# Patient Record
Sex: MALE | Race: WHITE | NOT HISPANIC OR LATINO | Employment: FULL TIME | URBAN - METROPOLITAN AREA
[De-identification: names, ages, dates, MRNs, and addresses within clinical notes are randomized per-mention and may not be internally consistent; named-entity substitution may affect disease eponyms.]

---

## 2018-01-18 NOTE — PROGRESS NOTES
Chief Complaint  pt rec flu vaccine      Active Problems    1  Acute serous otitis media (381 01) (H65 00)   2  Benign essential hypertension (401 1) (I10)   3  Blood pressure elevated (401 9) (I10)   4  BMI 36 0-36 9,adult (V85 36) (Z68 36)   5  Mixed hyperlipidemia (272 2) (E78 2)   6  Nicotine dependence (305 1) (F17 200)   7  Obstructive sleep apnea (327 23) (G47 33)    Current Meds   1  Amoxicillin 875 MG Oral Tablet; TAKE 1 TABLET EVERY 12 HOURS DAILY; Therapy: 31OSU9496 to (Evaluate:[id number])  Requested for: 43RVU4754; Last   Rx:[id number] Ordered   2  Metoprolol Succinate ER 25 MG Oral Tablet Extended Release 24 Hour (Toprol XL); 1   every day; Therapy: 27KYI5290 to (Last Rx:[id number])  Requested for: 35VFT4665 Ordered    Allergies    1   Sulfa Drugs    Signatures   Electronically signed by : MATRIN Rendon ; Oct 25 2016  3:13PM EST

## 2018-02-13 ENCOUNTER — OFFICE VISIT (OUTPATIENT)
Dept: FAMILY MEDICINE CLINIC | Facility: CLINIC | Age: 53
End: 2018-02-13
Payer: COMMERCIAL

## 2018-02-13 DIAGNOSIS — R73.9 ELEVATED BLOOD SUGAR: Primary | ICD-10-CM

## 2018-02-13 DIAGNOSIS — E11.9 TYPE 2 DIABETES MELLITUS WITHOUT COMPLICATION, WITHOUT LONG-TERM CURRENT USE OF INSULIN (HCC): Primary | ICD-10-CM

## 2018-02-13 LAB — SL AMB POCT HEMOGLOBIN AIC: 10.5

## 2018-02-13 PROCEDURE — 83036 HEMOGLOBIN GLYCOSYLATED A1C: CPT | Performed by: FAMILY MEDICINE

## 2018-02-16 NOTE — PROGRESS NOTES
I have reviewed the notes, assessments, and/or procedures performed by resident, I concurconcur with her/his documentation of Maxim Mendoza

## 2018-03-26 RX ORDER — METHYLPREDNISOLONE 4 MG/1
4 TABLET ORAL 2 TIMES DAILY
Refills: 0 | Status: CANCELLED | OUTPATIENT
Start: 2018-03-26

## 2018-04-21 ENCOUNTER — APPOINTMENT (EMERGENCY)
Dept: RADIOLOGY | Facility: HOSPITAL | Age: 53
End: 2018-04-21
Payer: COMMERCIAL

## 2018-04-21 ENCOUNTER — HOSPITAL ENCOUNTER (EMERGENCY)
Facility: HOSPITAL | Age: 53
Discharge: HOME | End: 2018-04-21
Attending: EMERGENCY MEDICINE
Payer: COMMERCIAL

## 2018-04-21 VITALS
RESPIRATION RATE: 20 BRPM | TEMPERATURE: 99.1 F | HEART RATE: 96 BPM | WEIGHT: 235 LBS | SYSTOLIC BLOOD PRESSURE: 156 MMHG | OXYGEN SATURATION: 94 % | DIASTOLIC BLOOD PRESSURE: 88 MMHG | BODY MASS INDEX: 33.72 KG/M2

## 2018-04-21 DIAGNOSIS — I10 HTN (HYPERTENSION): ICD-10-CM

## 2018-04-21 DIAGNOSIS — S63.275A CLOSED DISLOCATION OF INTERPHALANGEAL JOINT OF LEFT RING FINGER: Primary | ICD-10-CM

## 2018-04-21 PROCEDURE — 99283 EMERGENCY DEPT VISIT LOW MDM: CPT

## 2018-04-21 PROCEDURE — 73140 X-RAY EXAM OF FINGER(S): CPT

## 2018-04-21 PROCEDURE — 96374 THER/PROPH/DIAG INJ IV PUSH: CPT

## 2018-04-21 PROCEDURE — 73130 X-RAY EXAM OF HAND: CPT

## 2018-04-21 PROCEDURE — 96375 TX/PRO/DX INJ NEW DRUG ADDON: CPT

## 2018-04-21 RX ORDER — LORAZEPAM 2 MG/ML
1 INJECTION INTRAMUSCULAR ONCE
Status: COMPLETED | OUTPATIENT
Start: 2018-04-21 | End: 2018-04-21

## 2018-04-21 RX ORDER — NAPROXEN 500 MG/1
500 TABLET ORAL 2 TIMES DAILY WITH MEALS
Qty: 20 TABLET | Refills: 0 | Status: SHIPPED | OUTPATIENT
Start: 2018-04-21

## 2018-04-21 RX ORDER — METOPROLOL SUCCINATE 25 MG/1
25 TABLET, EXTENDED RELEASE ORAL DAILY
COMMUNITY
End: 2018-12-11 | Stop reason: SDUPTHER

## 2018-04-21 RX ORDER — METOPROLOL TARTRATE 5 MG/5ML
5 INJECTION INTRAVENOUS ONCE
Status: COMPLETED | OUTPATIENT
Start: 2018-04-21 | End: 2018-04-21

## 2018-04-21 RX ORDER — FENTANYL CITRATE 50 UG/ML
100 INJECTION, SOLUTION INTRAMUSCULAR; INTRAVENOUS ONCE
Status: COMPLETED | OUTPATIENT
Start: 2018-04-21 | End: 2018-04-21

## 2018-04-21 RX ORDER — IBUPROFEN 600 MG/1
600 TABLET ORAL ONCE
Status: COMPLETED | OUTPATIENT
Start: 2018-04-21 | End: 2018-04-21

## 2018-04-21 RX ADMIN — LORAZEPAM 1 MG: 2 INJECTION, SOLUTION INTRAMUSCULAR; INTRAVENOUS at 03:23

## 2018-04-21 RX ADMIN — FENTANYL CITRATE 100 MCG: 50 INJECTION, SOLUTION INTRAMUSCULAR; INTRAVENOUS at 03:23

## 2018-04-21 RX ADMIN — IBUPROFEN 600 MG: 600 TABLET ORAL at 03:22

## 2018-04-21 RX ADMIN — METOROPROLOL TARTRATE 5 MG: 5 INJECTION, SOLUTION INTRAVENOUS at 04:00

## 2018-04-21 RX ADMIN — LORAZEPAM 1 MG: 2 INJECTION INTRAMUSCULAR; INTRAVENOUS at 03:23

## 2018-04-21 NOTE — DISCHARGE INSTRUCTIONS
Please take a list of all of your medications and discharge paperwork with you to all of your follow-up medical visits  Please take all of your medications as directed  Please call your family doctor or return to the ER if you have increased shortness of breath, chest pain, fevers, chills, nausea, vomiting, diarrhea, or any other worsening symptoms  Finger Dislocation   WHAT YOU NEED TO KNOW:   A finger dislocation occurs when bones in your finger move out of their normal position  This takes place at a joint (where bones meet)  DISCHARGE INSTRUCTIONS:   Care for your finger:  Care for your finger as directed  This may help reduce pain and swelling  It also may improve how well you can move and use your finger  · Ice your finger: This can help decrease pain and swelling  Place a plastic bag filled with ice, or an ice pack, on your finger  Apply an ice pack as often as directed  · Elevate your finger:  Keep your finger above the level of your heart  This can help reduce swelling  Prop your arm or hand on a pillow  This should be done as often as you can for the first 1 to 3 days after your injury  Medicines:   · Pain medicine: You may be given medicine to take at home to take away or decrease pain  Do not wait until the pain is too bad before taking your medicine  · Take your medicine as directed  Contact your healthcare provider if you think your medicine is not helping or if you have side effects  Tell him or her if you are allergic to any medicine  Keep a list of the medicines, vitamins, and herbs you take  Include the amounts, and when and why you take them  Bring the list or the pill bottles to follow-up visits  Carry your medicine list with you in case of an emergency  Exercise your finger:  Exercise can help reduce pain, swelling, and stiffness in your finger  It also can help increase strength and movement  You may need to exercise your finger as soon as you can   You also may be told not to move your finger for a few weeks  Be sure to follow your healthcare provider's instructions  Occupational therapy (OT) may be ordered for you  A therapist works with you to help you regain movement in your finger  Care for your splint or cast:  Your injured finger may be kristen-taped, splinted, or put in a cast to hold your finger or thumb in place while it heals  Kristen tape is when your injured finger is taped to the finger next to it  A splint is a piece of stiff material attached to your finger using cloth straps  You may have these treatments for 8 weeks or more  To care for your splint or cast:  · Do not get your splint or cast wet  Use a plastic bag to cover the splint or cast if you shower  · Keep your splint or cast clean  Make sure no dirt gets under your splint or cast     · Do not trim your cast without talking to your healthcare provider  Also, never remove your cast on your own  Follow up with your healthcare provider or hand specialist as directed:  Write down your questions so you remember to ask them during your follow-up visits  Contact your healthcare provider or hand specialist if:   · You have numbness or tingling in your hand  · The skin under your cast or splint burns or stings  · The skin around your cast becomes red or raw  · Your cast becomes cracked or damaged  · You have questions or concerns about your injury or treatment  Return to the emergency department if:   · You have increased swelling beneath your splint or cast     · You think your cast or splint is too tight  · You cannot move your fingers  © 2017 [address] Information is for End User's use only and may not be sold, redistributed or otherwise used for commercial purposes  All illustrations and images included in CareNotes® are the copyrighted property of [employer]  or Jamie Loving  The above information is an  only   It is not intended as medical advice for individual conditions or treatments  Talk to your doctor, nurse or pharmacist before following any medical regimen to see if it is safe and effective for you  Hypertension   WHAT YOU NEED TO KNOW:   Hypertension is high blood pressure (BP)  Your BP is the force of your blood moving against the walls of your arteries  Normal BP is less than 120/80  Prehypertension is between 120/80 and 139/89  Hypertension is 140/90 or higher  Hypertension causes your BP to get so high that your heart has to work much harder than normal  This can damage your heart  You can control hypertension with a healthy lifestyle or medicines  A controlled blood pressure helps protect your organs, such as your heart, lungs, brain, and kidneys  DISCHARGE INSTRUCTIONS:   Call 911 for any of the following:   · You have discomfort in your chest that feels like squeezing, pressure, fullness, or pain  · You become confused or have difficulty speaking  · You suddenly feel lightheaded or have trouble breathing  · You have pain or discomfort in your back, neck, jaw, stomach, or arm  Return to the emergency department if:   · You have a severe headache or vision loss  · You have weakness in an arm or leg  Contact your healthcare provider if:   · You feel faint, dizzy, confused, or drowsy  · You have been taking your BP medicine and your BP is still higher than your healthcare provider says it should be  · You have questions or concerns about your condition or care  Medicines: You may  need any of the following:  · Medicine  may be used to help lower your BP  You may need more than one type of medicine  Take the medicine exactly as directed  · Diuretics  help decrease extra fluid that collects in your body  This will help lower your BP  You may urinate more often while you take this medicine  · Cholesterol medicine  helps lower your cholesterol level   A low cholesterol level helps prevent heart disease and makes it easier to control your blood pressure  · Take your medicine as directed  Contact your healthcare provider if you think your medicine is not helping or if you have side effects  Tell him or her if you are allergic to any medicine  Keep a list of the medicines, vitamins, and herbs you take  Include the amounts, and when and why you take them  Bring the list or the pill bottles to follow-up visits  Carry your medicine list with you in case of an emergency  Follow up with your healthcare provider as directed: You will need to return to have your BP checked and to have other lab tests done  Write down your questions so you remember to ask them during your visits  Manage hypertension:  Talk with your healthcare provider about these and other ways to manage hypertension:  · Check your BP at home  Sit and rest for 5 minutes before you take your BP  Extend your arm and support it on a flat surface  Your arm should be at the same level as your heart  Follow the directions that came with your BP monitor  If possible, take at least 2 BP readings each time  Take your BP at least twice a day at the same times each day, such as morning and evening  Keep a record of your BP readings and bring it to your follow-up visits  Ask your healthcare provider what your BP should be  · Limit sodium (salt) as directed  Too much sodium can affect your fluid balance  Check labels to find low-sodium or no-salt-added foods  Some low-sodium foods use potassium salts for flavor  Too much potassium can also cause health problems  Your healthcare provider will tell you how much sodium and potassium are safe for you to have in a day  He or she may recommend that you limit sodium to 2,300 mg a day  · Follow the meal plan recommended by your healthcare provider  A dietitian or your provider can give you more information on low-sodium plans or the DASH (Dietary Approaches to Stop Hypertension) eating plan   The DASH plan is low in sodium, unhealthy fats, and total fat  It is high in potassium, calcium, and fiber  · Exercise to maintain a healthy weight  Exercise at least 30 minutes per day, on most days of the week  This will help decrease your blood pressure  Ask your healthcare provider about the best exercise plan for you  · Decrease stress  This may help lower your BP  Learn ways to relax, such as deep breathing or listening to music  · Limit alcohol  Women should limit alcohol to 1 drink a day  Men should limit alcohol to 2 drinks a day  A drink of alcohol is 12 ounces of beer, 5 ounces of wine, or 1½ ounces of liquor  · Do not smoke  Nicotine and other chemicals in cigarettes and cigars can increase your BP and also cause lung damage  Ask your healthcare provider for information if you currently smoke and need help to quit  E-cigarettes or smokeless tobacco still contain nicotine  Talk to your healthcare provider before you use these products  · Manage any other health conditions you have  Health conditions such as diabetes can increase your risk for hypertension  Follow your healthcare provider's instructions and take all your medicines as directed  © 2017 [address] [facility] Information is for End User's use only and may not be sold, redistributed or otherwise used for commercial purposes  All illustrations and images included in CareNotes® are the copyrighted property of [employer]  or Jamie Loving  The above information is an  only  It is not intended as medical advice for individual conditions or treatments  Talk to your doctor, nurse or pharmacist before following any medical regimen to see if it is safe and effective for you
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
## 2018-04-25 ENCOUNTER — VBI (OUTPATIENT)
Dept: FAMILY MEDICINE CLINIC | Facility: CLINIC | Age: 53
End: 2018-04-25

## 2018-04-25 NOTE — TELEPHONE ENCOUNTER
S/W pt who states he does not need f/u at this time  Advised of hours, on call doctor, and phone number  CC: Hand Injury  DX: Closed dislocation of interphalangeal joint of left ring finger/HTN   CE

## 2018-05-08 ENCOUNTER — APPOINTMENT (OUTPATIENT)
Dept: RADIOLOGY | Facility: CLINIC | Age: 53
End: 2018-05-08
Payer: COMMERCIAL

## 2018-05-08 VITALS
SYSTOLIC BLOOD PRESSURE: 146 MMHG | WEIGHT: 233.8 LBS | DIASTOLIC BLOOD PRESSURE: 89 MMHG | BODY MASS INDEX: 33.47 KG/M2 | HEIGHT: 70 IN | HEART RATE: 97 BPM

## 2018-05-08 DIAGNOSIS — S63.259D DISLOCATION OF FINGER, SUBSEQUENT ENCOUNTER: ICD-10-CM

## 2018-05-08 DIAGNOSIS — IMO0001: Primary | ICD-10-CM

## 2018-05-08 PROCEDURE — 99203 OFFICE O/P NEW LOW 30 MIN: CPT | Performed by: ORTHOPAEDIC SURGERY

## 2018-05-08 PROCEDURE — 73140 X-RAY EXAM OF FINGER(S): CPT
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
## 2018-05-10 ENCOUNTER — EVALUATION (OUTPATIENT)
Dept: OCCUPATIONAL THERAPY | Facility: CLINIC | Age: 53
End: 2018-05-10
Payer: COMMERCIAL

## 2018-05-10 DIAGNOSIS — S63.289D DISLOCATION OF PROXIMAL INTERPHALANGEAL JOINT OF FINGER, SUBSEQUENT ENCOUNTER: Primary | ICD-10-CM

## 2018-05-10 DIAGNOSIS — IMO0001: ICD-10-CM

## 2018-05-10 PROCEDURE — 97165 OT EVAL LOW COMPLEX 30 MIN: CPT

## 2018-05-10 PROCEDURE — G8985 CARRY GOAL STATUS: HCPCS

## 2018-05-10 PROCEDURE — G8984 CARRY CURRENT STATUS: HCPCS
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
## 2018-05-14 ENCOUNTER — OFFICE VISIT (OUTPATIENT)
Dept: OCCUPATIONAL THERAPY | Facility: CLINIC | Age: 53
End: 2018-05-14
Payer: COMMERCIAL

## 2018-05-14 DIAGNOSIS — S63.289D DISLOCATION OF PROXIMAL INTERPHALANGEAL JOINT OF FINGER, SUBSEQUENT ENCOUNTER: Primary | ICD-10-CM

## 2018-05-14 PROCEDURE — 97110 THERAPEUTIC EXERCISES: CPT

## 2018-05-14 PROCEDURE — 97140 MANUAL THERAPY 1/> REGIONS: CPT

## 2018-05-14 NOTE — PROGRESS NOTES
Daily Note     Today's date: 2018  Patient name: Juan Bear  : 1965  MRN: [id number]  Referring provider: Alvaro Cuevas MD  Dx:   Encounter Diagnosis     ICD-10-CM    1  Dislocation of proximal interphalangeal joint of finger, subsequent encounter S63 289D                   Subjective: 0/10      Objective: See treatment diary below  Precautions: Standard    Specialty Daily Treatment Diary     Manual  18       graston Ring finger       Joint mobs PIP                                   Exercise Diary  18       Colored pegs 10 x 2 interossei        Thin pegs 15 x 2 interossei strengthening       Clothes pegs 2 x 20       dice 3 x 12                                                                                                                                           Modalities 18       Moist heat X~ 5 mins                                 Assessment: Tolerated treatment well  Patient would benefit from continued OT      Plan: Continue per plan of care

## 2018-05-17 ENCOUNTER — OFFICE VISIT (OUTPATIENT)
Dept: OCCUPATIONAL THERAPY | Facility: CLINIC | Age: 53
End: 2018-05-17
Payer: COMMERCIAL

## 2018-05-17 DIAGNOSIS — S63.289D DISLOCATION OF PROXIMAL INTERPHALANGEAL JOINT OF FINGER, SUBSEQUENT ENCOUNTER: Primary | ICD-10-CM

## 2018-05-17 PROCEDURE — 97110 THERAPEUTIC EXERCISES: CPT

## 2018-05-17 PROCEDURE — 97140 MANUAL THERAPY 1/> REGIONS: CPT

## 2018-05-17 NOTE — PROGRESS NOTES
Daily Note     Today's date: 2018  Patient name: Supriya Mathur  : 1965  MRN: [id number]  Referring provider: Jaylene Evans MD  Dx:   Encounter Diagnosis     ICD-10-CM    1  Dislocation of proximal interphalangeal joint of finger, subsequent encounter S63 289D           Subjective: 0/10      Objective:  Initiated therapy with moist heat x ~ 5 mins   Manual therapy with graston technique  PROM to joint  Application of kinesiotape for support and edema control  See treatment diary below  Precautions: Standard    Specialty Daily Treatment Diary     Manual  18      graston Ring finger Ring finger      Joint mobs PIP PIP                                  Exercise Diary  18      Colored pegs 10 x 2 interossei  10x 2      Thin pegs 15 x 2 interossei strengthening 15x 2       Clothes pegs 2 x 20 2 x 20      dice 3 x 12 3 x 12      Power bar  Flex /ext 1 x 15      Interossei strengthening  2 x 20                                                                                                                           Modalities 18      Moist heat X~ 5 mins X ~ 5 mins                                Assessment: Tolerated treatment well  Patient would benefit from continued OT    Pain out 0/10  Plan: Continue per plan of care

## 2018-05-22 ENCOUNTER — OFFICE VISIT (OUTPATIENT)
Dept: OCCUPATIONAL THERAPY | Facility: CLINIC | Age: 53
End: 2018-05-22
Payer: COMMERCIAL

## 2018-05-22 DIAGNOSIS — IMO0001: Primary | ICD-10-CM

## 2018-05-22 PROCEDURE — 97110 THERAPEUTIC EXERCISES: CPT

## 2018-05-22 PROCEDURE — 97140 MANUAL THERAPY 1/> REGIONS: CPT

## 2018-05-22 NOTE — PROGRESS NOTES
Daily Note     Today's date: 2018  Patient name: Moon Hayward  : 1965  MRN: [id number]  Referring provider: Rosie Jarrett MD  Dx:   Encounter Diagnosis     ICD-10-CM    1  Closed dislocation finger, proximal interphalangeal joint, traumatic, initial encounter S63 289A           Subjective: 0/10-  stiffness      Objective:  Initiated therapy with moist heat x ~ 5 mins   Manual therapy with graston technique  PROM to joint  Application of kinesiotape for support and edema control  See treatment diary below  Precautions: Standard    Specialty Daily Treatment Diary     Manual  18     graston Ring finger Ring finger Ring finger     Joint mobs PIP PIP PIP                                 Exercise Diary  18     Colored pegs 10 x 2 interossei  10x 2 10 x 2     Thin pegs 15 x 2 interossei strengthening 15x 2  15 x 2     Clothes pegs 2 x 20 2 x 20 2 x 20     dice 3 x 12 3 x 12 3 x 12     Power bar  Flex /ext 1 x 15 Flex/ext 1 x 15     Interossei strengthening  2 x 20  Green ball 2 x 15 tan ball     Finger springs   Y elastics x 15                                                                                                                 Modalities 18      Moist heat X~ 5 mins X ~ 5 mins                                Assessment: Tolerated treatment well  Patient would benefit from continued OT    Pain out 0/10  Plan: Continue per plan of care

## 2018-05-24 ENCOUNTER — APPOINTMENT (OUTPATIENT)
Dept: OCCUPATIONAL THERAPY | Facility: CLINIC | Age: 53
End: 2018-05-24
Payer: COMMERCIAL

## 2018-05-29 ENCOUNTER — OFFICE VISIT (OUTPATIENT)
Dept: OCCUPATIONAL THERAPY | Facility: CLINIC | Age: 53
End: 2018-05-29
Payer: COMMERCIAL

## 2018-05-29 DIAGNOSIS — S63.289D DISLOCATION OF PROXIMAL INTERPHALANGEAL JOINT OF FINGER, SUBSEQUENT ENCOUNTER: Primary | ICD-10-CM

## 2018-05-29 PROCEDURE — 97140 MANUAL THERAPY 1/> REGIONS: CPT

## 2018-05-29 PROCEDURE — 97110 THERAPEUTIC EXERCISES: CPT

## 2018-05-29 NOTE — PROGRESS NOTES
Daily Note     Today's date: 2018  Patient name: Obed Townsend  : 1965  MRN: [id number]  Referring provider: Bora Enriquez MD  Dx:   Encounter Diagnosis     ICD-10-CM    1  Dislocation of proximal interphalangeal joint of finger, subsequent encounter S63 289D           Subjective: 0/10-  Stiffness pain with joint mobs 7/10      Objective:  Initiated therapy with moist heat x ~ 5 mins   Manual therapy with graston technique  PROM to joint  Application of coban wrap for support and edema control  See treatment diary below  Precautions: Standard    Specialty Daily Treatment Diary     Manual  18    graston Ring finger Ring finger Ring finger Ring finger    Joint mobs PIP PIP PIP pip                                Exercise Diary  18    Colored pegs 10 x 2 interossei  10x 2 10 x 2 10 x 2    Thin pegs 15 x 2 interossei strengthening 15x 2  15 x 2 15 x 2    Clothes pegs 2 x 20 2 x 20 2 x 20 2 x 20    dice 3 x 12 3 x 12 3 x 12     Power bar  Flex /ext 1 x 15 Flex/ext 1 x 15     Interossei strengthening  2 x 20  Green ball 2 x 15 tan ball     Finger springs   Y elastics x 15 Y elastics x 15                                                                                                                Modalities 18      Moist heat X~ 5 mins X ~ 5 mins                                Assessment: Tolerated treatment well  Patient would benefit from continued OT    Pain out 0/10  Plan: Continue per plan of care

## 2018-05-31 ENCOUNTER — OFFICE VISIT (OUTPATIENT)
Dept: OCCUPATIONAL THERAPY | Facility: CLINIC | Age: 53
End: 2018-05-31
Payer: COMMERCIAL

## 2018-05-31 DIAGNOSIS — S63.289D DISLOCATION OF PROXIMAL INTERPHALANGEAL JOINT OF FINGER, SUBSEQUENT ENCOUNTER: Primary | ICD-10-CM

## 2018-05-31 PROCEDURE — 97140 MANUAL THERAPY 1/> REGIONS: CPT

## 2018-05-31 PROCEDURE — G8985 CARRY GOAL STATUS: HCPCS

## 2018-05-31 PROCEDURE — 97110 THERAPEUTIC EXERCISES: CPT

## 2018-05-31 PROCEDURE — G8986 CARRY D/C STATUS: HCPCS

## 2018-05-31 NOTE — PROGRESS NOTES
Daily Note     Today's date: 2018  Patient name: Natali Carnes  : 1965  MRN: [id number]  Referring provider: Em Colbert MD  Dx:   Encounter Diagnosis     ICD-10-CM    1  Dislocation of proximal interphalangeal joint of finger, subsequent encounter S63 289D           Subjective: 0/10- soreness      Objective:  Initiated therapy with moist heat x ~ 5 mins   Manual therapy with graston technique  PROM to joint  Application of coban wrap for support and edema control  See treatment diary below  Precautions: Standard    Specialty Daily Treatment Diary     Manual  18   graston Ring finger Ring finger Ring finger Ring finger Ring finger   Joint mobs PIP PIP PIP pip PIP                               Exercise Diary  18   Colored pegs 10 x 2 interossei  10x 2 10 x 2 10 x 2 2 x10   Thin pegs 15 x 2 interossei strengthening 15x 2  15 x 2 15 x 2 2 x 15   Clothes pegs 2 x 20 2 x 20 2 x 20 2 x 20 2 x2 20   dice 3 x 12 3 x 12 3 x 12     Power bar  Flex /ext 1 x 15 Flex/ext 1 x 15     Interossei strengthening  2 x 20  Green ball 2 x 15 tan ball  2 x 15 tan ball   Finger springs   Y elastics x 15 Y elastics x 15 B elastic x 15                                                                                                               Modalities 18   Moist heat X~ 5 mins X ~ 5 mins   X ~ 5 mins                             Assessment: Tolerated treatment well  Patient would benefit from continued OT    Pain out 010  Plan: Continue per plan of care        OT DISCHARGE:  Patient had a 2nd opinion and was released from further OT sessions

## 2018-06-05 ENCOUNTER — TELEPHONE (OUTPATIENT)
Dept: OBGYN CLINIC | Facility: HOSPITAL | Age: 53
End: 2018-06-05

## 2018-06-05 NOTE — TELEPHONE ENCOUNTER
Patient is calling   [phone]    Patient sees Dr Eder Novak but feels that he needs someone else to look at his finger, lt 4th digit  Patient has seen his pcp & pcp feels that it is not healing properly & would like him to see you  Please advise if you can take him on & when he can be seen  Next available in [place] is September & [place] is August & patient does not feel that he should wait

## 2018-06-11 ENCOUNTER — OFFICE VISIT (OUTPATIENT)
Dept: OBGYN CLINIC | Facility: CLINIC | Age: 53
End: 2018-06-11
Payer: COMMERCIAL

## 2018-06-11 VITALS
HEART RATE: 93 BPM | HEIGHT: 70 IN | WEIGHT: 237.2 LBS | SYSTOLIC BLOOD PRESSURE: 181 MMHG | DIASTOLIC BLOOD PRESSURE: 91 MMHG | BODY MASS INDEX: 33.96 KG/M2

## 2018-06-11 DIAGNOSIS — S63.279A: ICD-10-CM

## 2018-06-11 DIAGNOSIS — S63.635A SPRAIN OF INTERPHALANGEAL JOINT OF LEFT RING FINGER, INITIAL ENCOUNTER: Primary | ICD-10-CM

## 2018-06-11 PROCEDURE — 99203 OFFICE O/P NEW LOW 30 MIN: CPT | Performed by: ORTHOPAEDIC SURGERY
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
## 2018-06-23 DIAGNOSIS — E11.9 TYPE 2 DIABETES MELLITUS WITHOUT COMPLICATION, WITHOUT LONG-TERM CURRENT USE OF INSULIN (HCC): ICD-10-CM

## 2018-08-17 ENCOUNTER — OFFICE VISIT (OUTPATIENT)
Dept: OBGYN CLINIC | Facility: HOSPITAL | Age: 53
End: 2018-08-17
Payer: COMMERCIAL

## 2018-08-17 VITALS
HEIGHT: 70 IN | SYSTOLIC BLOOD PRESSURE: 141 MMHG | BODY MASS INDEX: 33.44 KG/M2 | HEART RATE: 75 BPM | DIASTOLIC BLOOD PRESSURE: 87 MMHG | WEIGHT: 233.6 LBS

## 2018-08-17 DIAGNOSIS — S63.635D SPRAIN OF INTERPHALANGEAL JOINT OF LEFT RING FINGER, SUBSEQUENT ENCOUNTER: Primary | ICD-10-CM

## 2018-08-17 PROCEDURE — 99213 OFFICE O/P EST LOW 20 MIN: CPT | Performed by: ORTHOPAEDIC SURGERY

## 2018-08-17 NOTE — PROGRESS NOTES
ASSESSMENT/PLAN:    Assessment:   S/p Left ring finger PIP jt UCL sprain    Plan:   Resume activities as tolerated and WBAT    Follow Up:  PRN    To Do Next Visit:       General Discussions:       Operative Discussions:         _____________________________________________________  CHIEF COMPLAINT:  Chief Complaint   Patient presents with    Left Ring Finger - Follow-up         SUBJECTIVE:  Yola Singh is a 47y o  year old male who presents for follow up regarding Left ring finger PIP jt UCL sprain  Since last visit, Yola Singh has tried dx therapy, and only sometimes   with only partial relief  Today there is swelling and minimal discomfort to the left ring finger  Radiation: None  Associated symptoms: No Complaints    PAST MEDICAL HISTORY:  Past Medical History:   Diagnosis Date    Chest pain     last assessed 1/30/14    Diabetes mellitus ([facility] [address] )     Hypertension        PAST SURGICAL HISTORY:  History reviewed  No pertinent surgical history      FAMILY HISTORY:  Family History   Problem Relation Age of Onset    Parkinsonism Father     No Known Problems Mother        SOCIAL HISTORY:  Social History   Substance Use Topics    Smoking status: Current Every Day Smoker     Types: Cigars    Smokeless tobacco: Never Used      Comment: former per [employer]    Alcohol use Yes      Comment: rarely       MEDICATIONS:    Current Outpatient Prescriptions:     metFORMIN (GLUCOPHAGE) 500 mg tablet, TAKE ONE TABLET BY MOUTH TWICE A DAY, Disp: 120 tablet, Rfl: 0    metoprolol succinate (TOPROL-XL) 25 mg 24 hr tablet, Take 25 mg by mouth daily, Disp: , Rfl:     naproxen (EC NAPROSYN) 500 MG EC tablet, Take 1 tablet (500 mg total) by mouth 2 (two) times a day with meals (Patient not taking: Reported on 8/17/2018 ), Disp: 20 tablet, Rfl: 0    ALLERGIES:  Allergies   Allergen Reactions    Sulfa Antibiotics      Reaction Date: [id number];        REVIEW OF SYSTEMS:  Pertinent items are noted in HPI     LABS:  HgA1c:   Lab Results   Component Value Date    HGBA1C 10 5 02/13/2018     BMP: No results found for: GLUCOSE, CALCIUM, NA, K, CO2, CL, BUN, CREATININE        _____________________________________________________  PHYSICAL EXAMINATION:  General: well developed and well nourished, alert, oriented times 3 and appears comfortable  Psychiatric: Normal  HEENT: Trachea Midline, No torticollis  Cardiovascular: No discernable arrhythmia  Pulmonary: No wheezing or stridor  Skin: No masses, erthema, lacerations, fluctation, ulcerations  Neurovascular: Sensation Intact to the Median, Ulnar, Radial Nerve, Motor Intact to the Median, Ulnar, Radial Nerve and Pulses Intact    MUSCULOSKELETAL EXAMINATION:  LEFT SIDE:  hand: lacking termial 2mm of flexion in composit fist, swelling noted to ring finger pip jt, stable RCL, UCL  dorsal and volar palte intact   audrey test is normal    _____________________________________________________  STUDIES REVIEWED:  No Studies to review      PROCEDURES PERFORMED:  Procedures  No Procedures performed today   Scribe Attestation    I,:   Devika Terry am acting as a scribe while in the presence of the attending physician :        I,:   Fabian Hernandez MD personally performed the services described in this documentation    as scribed in my presence :

## 2018-12-11 DIAGNOSIS — E11.9 TYPE 2 DIABETES MELLITUS WITHOUT COMPLICATION, WITHOUT LONG-TERM CURRENT USE OF INSULIN (HCC): ICD-10-CM

## 2018-12-11 RX ORDER — METOPROLOL SUCCINATE 25 MG/1
25 TABLET, EXTENDED RELEASE ORAL DAILY
Qty: 30 TABLET | Refills: 5 | Status: SHIPPED | OUTPATIENT
Start: 2018-12-11 | End: 2020-02-06 | Stop reason: SDUPTHER

## 2019-06-03 DIAGNOSIS — M54.50 ACUTE BILATERAL LOW BACK PAIN WITHOUT SCIATICA: Primary | ICD-10-CM

## 2019-06-03 RX ORDER — CYCLOBENZAPRINE HCL 10 MG
10 TABLET ORAL 3 TIMES DAILY PRN
Qty: 20 TABLET | Refills: 0 | Status: SHIPPED | OUTPATIENT
Start: 2019-06-03

## 2019-10-26 ENCOUNTER — APPOINTMENT (EMERGENCY)
Dept: RADIOLOGY | Facility: HOSPITAL | Age: 54
End: 2019-10-26
Payer: COMMERCIAL

## 2019-10-26 ENCOUNTER — HOSPITAL ENCOUNTER (EMERGENCY)
Facility: HOSPITAL | Age: 54
Discharge: HOME | End: 2019-10-26
Attending: EMERGENCY MEDICINE | Admitting: EMERGENCY MEDICINE
Payer: COMMERCIAL

## 2019-10-26 VITALS
HEART RATE: 86 BPM | SYSTOLIC BLOOD PRESSURE: 167 MMHG | DIASTOLIC BLOOD PRESSURE: 79 MMHG | RESPIRATION RATE: 18 BRPM | OXYGEN SATURATION: 98 % | TEMPERATURE: 98.2 F

## 2019-10-26 DIAGNOSIS — S59.901A ELBOW INJURY, RIGHT, INITIAL ENCOUNTER: Primary | ICD-10-CM

## 2019-10-26 DIAGNOSIS — M25.519 SHOULDER PAIN: ICD-10-CM

## 2019-10-26 PROCEDURE — 99283 EMERGENCY DEPT VISIT LOW MDM: CPT

## 2019-10-26 PROCEDURE — 73080 X-RAY EXAM OF ELBOW: CPT

## 2019-10-26 PROCEDURE — 73030 X-RAY EXAM OF SHOULDER: CPT

## 2019-10-26 RX ORDER — NAPROXEN 500 MG/1
500 TABLET ORAL 2 TIMES DAILY WITH MEALS
Qty: 14 TABLET | Refills: 0 | Status: SHIPPED | OUTPATIENT
Start: 2019-10-26 | End: 2019-11-02

## 2019-10-26 RX ORDER — IBUPROFEN 400 MG/1
400 TABLET ORAL ONCE
Status: DISCONTINUED | OUTPATIENT
Start: 2019-10-26 | End: 2019-10-26

## 2019-10-27 NOTE — ED PROVIDER NOTES
History  Chief Complaint   Patient presents with    Shoulder Pain     pt presents to the ed with right shoudler and elbow pain post fall     Elbow Injury     Patient is a 43-year-old male presents with complaint of a fall approximately 12 hours prior to arrival to the emergency room  Patient states he slipped off the bottom step landing on his right shoulder and elbow  Patient states the 1st not bother him too much but as the day went on he noticed that he was having increasing pain and swelling around his elbow area and that he had difficulty flexing the arm completely  Patient denies any numbness or tingling or weakness to the hand  Patient denies any head injury or any neck or back pain  Patient states right shoulder pain is mild and it is mostly on the anterior surface and the collar bone and it is a little bit worse with complete raising of his arm over his head  Prior to Admission Medications   Prescriptions Last Dose Informant Patient Reported? Taking? cyclobenzaprine (FLEXERIL) 10 mg tablet   No No   Sig: Take 1 tablet (10 mg total) by mouth 3 (three) times a day as needed for muscle spasms   metFORMIN (GLUCOPHAGE) 500 mg tablet   No No   Sig: Take 1 tablet (500 mg total) by mouth 2 (two) times a day   metoprolol succinate (TOPROL-XL) 25 mg 24 hr tablet   No No   Sig: Take 1 tablet (25 mg total) by mouth daily   naproxen (EC NAPROSYN) 500 MG EC tablet   No No   Sig: Take 1 tablet (500 mg total) by mouth 2 (two) times a day with meals   Patient not taking: Reported on 8/17/2018       Facility-Administered Medications: None       Past Medical History:   Diagnosis Date    Chest pain     last assessed 1/30/14    Diabetes mellitus ([facility] [address] )     Hypertension        History reviewed  No pertinent surgical history  Family History   Problem Relation Age of Onset    Parkinsonism Father     No Known Problems Mother      I have reviewed and agree with the history as documented      Social History Tobacco Use    Smoking status: Current Every Day Smoker     Types: Cigars    Smokeless tobacco: Never Used    Tobacco comment: former per [employer]   Substance Use Topics    Alcohol use: Yes     Comment: rarely    Drug use: No        Review of Systems   Constitutional: Negative for chills and fever  HENT: Negative for facial swelling and trouble swallowing  Respiratory: Negative for chest tightness and shortness of breath  Cardiovascular: Negative for chest pain and leg swelling  Gastrointestinal: Negative for abdominal pain, nausea and vomiting  Musculoskeletal: Negative for back pain and neck pain  Skin: Negative  Neurological: Negative for weakness and numbness  Hematological: Negative  Psychiatric/Behavioral: Negative  Physical Exam  Physical Exam   Constitutional: He appears well-developed and well-nourished  HENT:   Head: Normocephalic and atraumatic  Neck: Normal range of motion  Neck supple  Cardiovascular: Normal rate and regular rhythm  Pulmonary/Chest: Effort normal and breath sounds normal    Musculoskeletal:        Right shoulder: He exhibits tenderness and bony tenderness  He exhibits normal range of motion, no swelling, no effusion, no crepitus, normal pulse and normal strength  Right elbow: He exhibits decreased range of motion and swelling  He exhibits no effusion and no deformity  Tenderness found  Radial head tenderness noted  No medial epicondyle, no lateral epicondyle and no olecranon process tenderness noted  The patient has full extension of the right elbow but has flexion to about 145° otherwise the extremity is neurovascularly intact   Nursing note and vitals reviewed        Vital Signs  ED Triage Vitals [10/26/19 1946]   Temperature Pulse Respirations Blood Pressure SpO2   98 2 °F (36 8 °C) (!) 125 18 167/79 98 %      Temp Source Heart Rate Source Patient Position - Orthostatic VS BP Location FiO2 (%)   Tympanic Monitor -- -- -- Pain Score       --           Vitals:    10/26/19 1946 10/26/19 2007   BP: 167/79    Pulse: (!) 125 86         Visual Acuity      ED Medications  Medications - No data to display    Diagnostic Studies  Results Reviewed     None                 XR shoulder 2+ views RIGHT   Final Result by Robinson Serrano MD (10/27 1046)      No acute osseous abnormality  Workstation performed: [id number]         XR elbow 3+ vw RIGHT   Final Result by Robinson Serrano MD (10/27 1047)      No acute osseous abnormality  Workstation performed: [id number]                    Procedures  Procedures       ED Course                               MDM  Number of Diagnoses or Management Options  Elbow injury, right, initial encounter:   Shoulder pain:   Diagnosis management comments: Patient's x-ray showed no acute fractures or dislocation  The patient was instructed of on treatment for the elbow issue with a Ace wrap anti-inflammatory pain medications and using a sling for approximately 5-7 days  Patient instructed there is no improvement with conservative treatment he should follow up with an orthopedist   Tania Quiñones all questions the best my ability, the patient states understanding and is in agreement with the assessment plan  Amount and/or Complexity of Data Reviewed  Tests in the radiology section of CPT®: ordered and reviewed        Disposition  Final diagnoses:   Elbow injury, right, initial encounter   Shoulder pain     Time reflects when diagnosis was documented in both MDM as applicable and the Disposition within this note     Time User Action Codes Description Comment    10/26/2019  8:51 PM Talita Zacarias [[id number]] Elbow injury, right, initial encounter     10/26/2019  8:52 PM Talita Zacarias [[id number]] Shoulder pain       ED Disposition     ED Disposition Condition Date/Time Comment    Discharge Stable Sat Oct 26, 2019  8:51 PM [place] discharge to home/self care  Follow-up Information     Follow up With Specialties Details Why Contact Info    Larry Montes MD Orthopedic Surgery In 1 week If symptoms worsen [address]  [phone]            Discharge Medication List as of 10/26/2019  8:53 PM      START taking these medications    Details   naproxen (NAPROSYN) 500 mg tablet Take 1 tablet (500 mg total) by mouth 2 (two) times a day with meals for 7 days, Starting Sat 10/26/2019, Until Sat 11/2/2019, Print         CONTINUE these medications which have NOT CHANGED    Details   cyclobenzaprine (FLEXERIL) 10 mg tablet Take 1 tablet (10 mg total) by mouth 3 (three) times a day as needed for muscle spasms, Starting Mon 6/3/2019, Normal      metFORMIN (GLUCOPHAGE) 500 mg tablet Take 1 tablet (500 mg total) by mouth 2 (two) times a day, Starting Tue 12/11/2018, Normal      metoprolol succinate (TOPROL-XL) 25 mg 24 hr tablet Take 1 tablet (25 mg total) by mouth daily, Starting Tue 12/11/2018, Normal      naproxen (EC NAPROSYN) 500 MG EC tablet Take 1 tablet (500 mg total) by mouth 2 (two) times a day with meals, Starting Sat 4/21/2018, Normal           No discharge procedures on file      ED Provider  Electronically Signed by           Lela Pan MD  10/27/19 8674

## 2020-02-06 ENCOUNTER — CLINICAL SUPPORT (OUTPATIENT)
Dept: FAMILY MEDICINE CLINIC | Facility: CLINIC | Age: 55
End: 2020-02-06
Payer: COMMERCIAL

## 2020-02-06 VITALS
OXYGEN SATURATION: 99 % | DIASTOLIC BLOOD PRESSURE: 90 MMHG | HEIGHT: 70 IN | BODY MASS INDEX: 33.64 KG/M2 | WEIGHT: 235 LBS | SYSTOLIC BLOOD PRESSURE: 148 MMHG

## 2020-02-06 DIAGNOSIS — E78.5 HYPERLIPIDEMIA: ICD-10-CM

## 2020-02-06 DIAGNOSIS — Z23 ENCOUNTER FOR IMMUNIZATION: Primary | ICD-10-CM

## 2020-02-06 DIAGNOSIS — E11.9 TYPE 2 DIABETES MELLITUS WITHOUT COMPLICATION, WITHOUT LONG-TERM CURRENT USE OF INSULIN (HCC): ICD-10-CM

## 2020-02-06 LAB — SL AMB POCT HEMOGLOBIN AIC: 9.6 (ref ?–6.5)

## 2020-02-06 PROCEDURE — 3008F BODY MASS INDEX DOCD: CPT | Performed by: FAMILY MEDICINE

## 2020-02-06 PROCEDURE — 90471 IMMUNIZATION ADMIN: CPT | Performed by: FAMILY MEDICINE

## 2020-02-06 PROCEDURE — 99213 OFFICE O/P EST LOW 20 MIN: CPT | Performed by: FAMILY MEDICINE

## 2020-02-06 PROCEDURE — 3077F SYST BP >= 140 MM HG: CPT | Performed by: FAMILY MEDICINE

## 2020-02-06 PROCEDURE — 83036 HEMOGLOBIN GLYCOSYLATED A1C: CPT | Performed by: FAMILY MEDICINE

## 2020-02-06 PROCEDURE — 90682 RIV4 VACC RECOMBINANT DNA IM: CPT | Performed by: FAMILY MEDICINE

## 2020-02-06 PROCEDURE — 3080F DIAST BP >= 90 MM HG: CPT | Performed by: FAMILY MEDICINE

## 2020-02-06 PROCEDURE — 3046F HEMOGLOBIN A1C LEVEL >9.0%: CPT | Performed by: FAMILY MEDICINE

## 2020-02-06 RX ORDER — METOPROLOL SUCCINATE 25 MG/1
25 TABLET, EXTENDED RELEASE ORAL DAILY
Qty: 30 TABLET | Refills: 2 | Status: SHIPPED | OUTPATIENT
Start: 2020-02-06 | End: 2020-04-14 | Stop reason: SDUPTHER

## 2020-02-07 NOTE — PATIENT INSTRUCTIONS
Metformin (By mouth)   Metformin Hydrochloride (met-FOR-min michelle-droe-KLOR-shivam)  Treats type 2 diabetes  Brand Name(s): Fortamet, Glucophage, Glucophage XR, Glumetza, Riomet   There may be other brand names for this medicine  When This Medicine Should Not Be Used: This medicine is not right for everyone  Do not use if you had an allergic reaction to metformin, or if you have severe kidney problems or metabolic acidosis  How to Use This Medicine:   Liquid, Tablet, Long Acting Tablet  · Take your medicine as directed  Your dose may need to be changed several times to find what works best for you  · It is best to take this medicine with food or milk  · Swallow the extended-release tablet whole  Do not crush, break, or chew it  Tell your doctor if you have trouble swallowing the tablets whole  · Measure the oral liquid medicine with a marked measuring spoon, oral syringe, or medicine cup  · Read and follow the patient instructions that come with this medicine  Talk to your doctor or pharmacist if you have any questions  · Missed dose: Take a dose as soon as you remember  If it is almost time for your next dose, wait until then and take a regular dose  Do not take extra medicine to make up for a missed dose  · Store the medicine in a closed container at room temperature, away from heat, moisture, and direct light  Drugs and Foods to Avoid:   Ask your doctor or pharmacist before using any other medicine, including over-the-counter medicines, vitamins, and herbal products  · Some medicines can affect how metformin works   Tell your doctor if you are using any of the following:  ¨ Acetazolamide  ¨ Dichlorphenamide  ¨ Diuretics (water pills)  ¨ Estrogen or birth control pills  ¨ Heart or blood pressure medicine  ¨ Isoniazid  ¨ Nicotinic acid  ¨ Phenothiazine medicine  ¨ Phenytoin  ¨ Steroid medicine  ¨ Thyroid medicine  ¨ Topiramate  ¨ Zonisamide  Warnings While Using This Medicine:   · Tell your doctor if you are pregnant or breastfeeding, or if you have heart or blood vessel disease, heart failure, blood circulation problems, kidney disease, liver disease, anemia, an adrenal gland or pituitary gland disorder, or a vitamin B12 deficiency  Tell your doctor if you had a heart attack  Tell your doctor if you drink alcohol  · Too much of this medicine can cause a rare, but serious condition called lactic acidosis  · Part of the extended-release tablet may pass in your stool  This is normal   · Make sure any doctor or dentist who treats you knows that you are using this medicine  You may need to stop using this medicine before you have surgery, an x-ray, CT scan, or other medical test   · Your doctor will do lab tests at regular visits to check on the effects of this medicine  Keep all appointments  · Keep all medicine out of the reach of children  Never share your medicine with anyone  Possible Side Effects While Using This Medicine:   Call your doctor right away if you notice any of these side effects:  · Allergic reaction: Itching or hives, swelling in your face or hands, swelling or tingling in your mouth or throat, chest tightness, trouble breathing  · Confusion, fast heartbeat, increased hunger, shakiness  · Fever or chills  · Stomach pain, nausea, vomiting, muscle pain or cramping  · Trouble breathing, slow heartbeat, lightheadedness, dizziness  · Unusual tiredness or weakness  If you notice these less serious side effects, talk with your doctor:   · Diarrhea, gas, nausea  If you notice other side effects that you think are caused by this medicine, tell your doctor  Call your doctor for medical advice about side effects  You may report side effects to FDA at 5-435-FDA-0744  © 2017 [employer] Information is for End User's use only and may not be sold, redistributed or otherwise used for commercial purposes  The above information is an  only   It is not intended as medical advice for individual conditions or treatments  Talk to your doctor, nurse or pharmacist before following any medical regimen to see if it is safe and effective for you

## 2020-04-14 DIAGNOSIS — E11.9 TYPE 2 DIABETES MELLITUS WITHOUT COMPLICATION, WITHOUT LONG-TERM CURRENT USE OF INSULIN (HCC): ICD-10-CM

## 2020-04-14 RX ORDER — METOPROLOL SUCCINATE 25 MG/1
25 TABLET, EXTENDED RELEASE ORAL DAILY
Qty: 30 TABLET | Refills: 2 | Status: SHIPPED | OUTPATIENT
Start: 2020-04-14 | End: 2020-07-22

## 2020-04-16 DIAGNOSIS — E11.9 TYPE 2 DIABETES MELLITUS WITHOUT COMPLICATION, WITHOUT LONG-TERM CURRENT USE OF INSULIN (HCC): ICD-10-CM

## 2020-05-01 ENCOUNTER — TELEMEDICINE (OUTPATIENT)
Dept: FAMILY MEDICINE CLINIC | Facility: CLINIC | Age: 55
End: 2020-05-01
Payer: COMMERCIAL

## 2020-05-01 DIAGNOSIS — I10 BENIGN ESSENTIAL HYPERTENSION: ICD-10-CM

## 2020-05-01 DIAGNOSIS — E11.9 TYPE 2 DIABETES MELLITUS WITHOUT COMPLICATION, WITHOUT LONG-TERM CURRENT USE OF INSULIN (HCC): ICD-10-CM

## 2020-05-01 DIAGNOSIS — G47.33 OBSTRUCTIVE SLEEP APNEA: Primary | ICD-10-CM

## 2020-05-01 PROCEDURE — 99213 OFFICE O/P EST LOW 20 MIN: CPT | Performed by: FAMILY MEDICINE

## 2020-07-18 ENCOUNTER — HOSPITAL ENCOUNTER (EMERGENCY)
Facility: HOSPITAL | Age: 55
Discharge: HOME | End: 2020-07-18
Attending: EMERGENCY MEDICINE | Admitting: EMERGENCY MEDICINE
Payer: COMMERCIAL

## 2020-07-18 VITALS
DIASTOLIC BLOOD PRESSURE: 96 MMHG | RESPIRATION RATE: 19 BRPM | SYSTOLIC BLOOD PRESSURE: 178 MMHG | OXYGEN SATURATION: 97 % | TEMPERATURE: 98.5 F | HEART RATE: 89 BPM

## 2020-07-18 DIAGNOSIS — Z20.822 ENCOUNTER FOR LABORATORY TESTING FOR COVID-19 VIRUS: Primary | ICD-10-CM

## 2020-07-18 PROCEDURE — 99284 EMERGENCY DEPT VISIT MOD MDM: CPT | Performed by: PHYSICIAN ASSISTANT

## 2020-07-18 PROCEDURE — U0003 INFECTIOUS AGENT DETECTION BY NUCLEIC ACID (DNA OR RNA); SEVERE ACUTE RESPIRATORY SYNDROME CORONAVIRUS 2 (SARS-COV-2) (CORONAVIRUS DISEASE [COVID-19]), AMPLIFIED PROBE TECHNIQUE, MAKING USE OF HIGH THROUGHPUT TECHNOLOGIES AS DESCRIBED BY CMS-2020-01-R: HCPCS | Performed by: PHYSICIAN ASSISTANT

## 2020-07-18 PROCEDURE — 99282 EMERGENCY DEPT VISIT SF MDM: CPT

## 2020-07-18 NOTE — ED PROVIDER NOTES
History  Chief Complaint   Patient presents with    Labs Only     patient requesting a rapid covid test   states he is due to travel soon and was recently exposed to a person who was positive for covid  48year old male hx HTN, diabetes presents here for covid test  He is due to travel soon and would like to get tested for covid before going  States some people at work were not wearing masks due to "medical conditions" and contracted covid  He is currently feeling well and is asymptomatic  Has not had his BP medications in 3-4 days due to needing a refill  No fever, chills, chest pain, difficulty breathing, shortness of breath, visual disturbances, headache, dizziness, lightheadedness, weakness  Prior to Admission Medications   Prescriptions Last Dose Informant Patient Reported? Taking? cyclobenzaprine (FLEXERIL) 10 mg tablet   No No   Sig: Take 1 tablet (10 mg total) by mouth 3 (three) times a day as needed for muscle spasms   metFORMIN (GLUCOPHAGE) 1000 MG tablet   No No   Sig: Take 1 tablet (1,000 mg total) by mouth 2 (two) times a day with meals   metoprolol succinate (TOPROL-XL) 25 mg 24 hr tablet   No No   Sig: Take 1 tablet (25 mg total) by mouth daily   naproxen (EC NAPROSYN) 500 MG EC tablet   No No   Sig: Take 1 tablet (500 mg total) by mouth 2 (two) times a day with meals   Patient not taking: Reported on 8/17/2018    naproxen (NAPROSYN) 500 mg tablet   No No   Sig: Take 1 tablet (500 mg total) by mouth 2 (two) times a day with meals for 7 days      Facility-Administered Medications: None       Past Medical History:   Diagnosis Date    Chest pain     last assessed 1/30/14    Diabetes mellitus ([facility] [address] )     Hypertension        History reviewed  No pertinent surgical history  Family History   Problem Relation Age of Onset    Parkinsonism Father     No Known Problems Mother      I have reviewed and agree with the history as documented      E-Cigarette/Vaping    E-Cigarette Use Never User E-Cigarette/Vaping Substances     Social History     Tobacco Use    Smoking status: Former Smoker     Types: Cigars    Smokeless tobacco: Never Used    Tobacco comment: chews Nicorette   Substance Use Topics    Alcohol use: Yes     Frequency: Monthly or less     Comment: rarely    Drug use: No       Review of Systems   Constitutional: Negative for chills and fever  HENT: Negative for sneezing and sore throat  Respiratory: Negative for cough and shortness of breath  Cardiovascular: Negative for chest pain, palpitations and leg swelling  Gastrointestinal: Negative for abdominal pain, constipation, diarrhea, nausea and vomiting  Musculoskeletal: Negative for back pain, gait problem, joint swelling and myalgias  Skin: Negative for color change, pallor, rash and wound  Neurological: Negative for dizziness, syncope, weakness, light-headedness, numbness and headaches  All other systems reviewed and are negative  Physical Exam  Physical Exam   Constitutional: He appears well-developed and well-nourished  No distress  HENT:   Head: Normocephalic and atraumatic  Nose: Nose normal    Eyes: EOM are normal    Neck: Normal range of motion  Cardiovascular: Normal rate, regular rhythm, normal heart sounds and intact distal pulses  Exam reveals no gallop and no friction rub  No murmur heard  Pulmonary/Chest: Effort normal and breath sounds normal  No stridor  No respiratory distress  He has no wheezes  He has no rales  Sp02 is 97% indicating adequate oxygenation on room air   Skin: Skin is warm and dry  Capillary refill takes less than 2 seconds  No rash noted  He is not diaphoretic  No erythema  No pallor  Nursing note and vitals reviewed        Vital Signs  ED Triage Vitals [07/18/20 1530]   Temperature Pulse Respirations Blood Pressure SpO2   98 5 °F (36 9 °C) (!) 110 19 (!) 199/102 97 %      Temp Source Heart Rate Source Patient Position - Orthostatic VS BP Location FiO2 (%) Tympanic Monitor Sitting Right arm --      Pain Score       --           Vitals:    07/18/20 1530 07/18/20 1550 07/18/20 1603   BP: (!) 199/102  (!) 178/96   Pulse: (!) 110 89    Patient Position - Orthostatic VS: Sitting  Sitting         Visual Acuity      ED Medications  Medications - No data to display    Diagnostic Studies  Results Reviewed     Procedure Component Value Units Date/Time    Novel Coronavirus [facility] [[id number]] Collected:  07/18/20 1545    Lab Status: In process Specimen:  Nares from Nose Updated:  07/18/20 1548                 No orders to display              Procedures  Procedures         ED Course       US AUDIT      Most Recent Value   Initial Alcohol Screen: US AUDIT-C    1  How often do you have a drink containing alcohol? 1 Filed at: 07/18/2020 1530   2  How many drinks containing alcohol do you have on a typical day you are drinking? 0 Filed at: 07/18/2020 1530   3a  Male UNDER 65: How often do you have five or more drinks on one occasion? 0 Filed at: 07/18/2020 1530   Audit-C Score  1 Filed at: 07/18/2020 1530                  NASRA/DAST-10      Most Recent Value   How many times in the past year have you    Used an illegal drug or used a prescription medication for non-medical reasons? Never Filed at: 07/18/2020 1531                                MDM  Number of Diagnoses or Management Options  Encounter for laboratory testing for COVID-19 virus:   Diagnosis management comments: Advised self quarantine until covid results are relayed  BP trending downwards in ED without intervention, pt called for refill of medication to pharmacy while in ED  Gave patient proper education regarding diagnosis  Answered all questions  Return to ED for any worsening of symptoms otherwise follow up with primary care physician for re-evaluation  Discussed plan with patient who verbalized understanding and agreed to plan         Amount and/or Complexity of Data Reviewed  Review and summarize past medical records: yes  Discuss the patient with other providers: yes          Disposition  Final diagnoses:   Encounter for laboratory testing for COVID-19 virus     Time reflects when diagnosis was documented in both MDM as applicable and the Disposition within this note     Time User Action Codes Description Comment    7/18/2020  4:04 PM Kenneth Zacarias [Z11 [id number]] Encounter for laboratory testing for COVID-19 virus       ED Disposition     ED Disposition Condition Date/Time Comment    Discharge Stable Sat Jul 18, 2020  4:04 PM [place] discharge to home/self care  Follow-up Information     Follow up With Specialties Details Why Contact Info Additional Information    [address] [facility] Emergency Medicine Go to  As needed [address] [facility]  [phone] [facility], [place], [place], [address]          Patient's Medications   Discharge Prescriptions    No medications on file     No discharge procedures on file      PDMP Review     None          ED Provider  Electronically Signed by           Enrique Harrington PA-C  07/18/20 9881

## 2020-07-19 LAB — SARS-COV-2 RNA RESP QL NAA+PROBE: NEGATIVE

## 2020-07-21 DIAGNOSIS — E11.9 TYPE 2 DIABETES MELLITUS WITHOUT COMPLICATION, WITHOUT LONG-TERM CURRENT USE OF INSULIN (HCC): ICD-10-CM

## 2020-07-22 RX ORDER — METOPROLOL SUCCINATE 25 MG/1
25 TABLET, EXTENDED RELEASE ORAL DAILY
Qty: 30 TABLET | Refills: 2 | Status: SHIPPED | OUTPATIENT
Start: 2020-07-22

## 2020-09-28 ENCOUNTER — HOSPITAL ENCOUNTER (EMERGENCY)
Facility: HOSPITAL | Age: 55
Discharge: HOME | End: 2020-09-28
Attending: EMERGENCY MEDICINE | Admitting: EMERGENCY MEDICINE
Payer: COMMERCIAL

## 2020-09-28 VITALS
BODY MASS INDEX: 32.28 KG/M2 | HEART RATE: 96 BPM | WEIGHT: 225 LBS | TEMPERATURE: 99 F | OXYGEN SATURATION: 98 % | SYSTOLIC BLOOD PRESSURE: 152 MMHG | RESPIRATION RATE: 18 BRPM | DIASTOLIC BLOOD PRESSURE: 87 MMHG

## 2020-09-28 DIAGNOSIS — R60.0 PERIORBITAL EDEMA OF LEFT EYE: ICD-10-CM

## 2020-09-28 DIAGNOSIS — T63.441A BEE STING REACTION: Primary | ICD-10-CM

## 2020-09-28 PROCEDURE — 99283 EMERGENCY DEPT VISIT LOW MDM: CPT

## 2020-09-28 PROCEDURE — 99284 EMERGENCY DEPT VISIT MOD MDM: CPT | Performed by: EMERGENCY MEDICINE

## 2020-09-28 RX ORDER — PREDNISONE 20 MG/1
40 TABLET ORAL DAILY
Qty: 6 TABLET | Refills: 0 | Status: SHIPPED | OUTPATIENT
Start: 2020-09-28 | End: 2020-10-01

## 2020-09-28 RX ORDER — METOPROLOL SUCCINATE 25 MG/1
25 TABLET, EXTENDED RELEASE ORAL ONCE
Status: COMPLETED | OUTPATIENT
Start: 2020-09-28 | End: 2020-09-28

## 2020-09-28 RX ADMIN — PREDNISONE 50 MG: 20 TABLET ORAL at 07:19

## 2020-09-28 RX ADMIN — METOPROLOL SUCCINATE 25 MG: 25 TABLET, EXTENDED RELEASE ORAL at 07:40

## 2021-02-19 DIAGNOSIS — E11.9 TYPE 2 DIABETES MELLITUS WITHOUT COMPLICATION, WITHOUT LONG-TERM CURRENT USE OF INSULIN (HCC): ICD-10-CM

## 2021-03-10 DIAGNOSIS — Z23 ENCOUNTER FOR IMMUNIZATION: ICD-10-CM

## 2021-03-23 ENCOUNTER — IMMUNIZATIONS (OUTPATIENT)
Dept: FAMILY MEDICINE CLINIC | Facility: HOSPITAL | Age: 56
End: 2021-03-23

## 2021-03-23 DIAGNOSIS — Z23 ENCOUNTER FOR IMMUNIZATION: Primary | ICD-10-CM

## 2021-03-23 PROCEDURE — 0001A SARS-COV-2 / COVID-19 MRNA VACCINE (PFIZER-BIONTECH) 30 MCG: CPT

## 2021-03-23 PROCEDURE — 91300 SARS-COV-2 / COVID-19 MRNA VACCINE (PFIZER-BIONTECH) 30 MCG: CPT

## 2021-04-15 ENCOUNTER — IMMUNIZATIONS (OUTPATIENT)
Dept: FAMILY MEDICINE CLINIC | Facility: HOSPITAL | Age: 56
End: 2021-04-15

## 2021-04-15 DIAGNOSIS — Z23 ENCOUNTER FOR IMMUNIZATION: Primary | ICD-10-CM

## 2021-04-15 PROCEDURE — 91300 SARS-COV-2 / COVID-19 MRNA VACCINE (PFIZER-BIONTECH) 30 MCG: CPT

## 2021-04-15 PROCEDURE — 0002A SARS-COV-2 / COVID-19 MRNA VACCINE (PFIZER-BIONTECH) 30 MCG: CPT

## 2021-08-06 ENCOUNTER — OFFICE VISIT (OUTPATIENT)
Dept: URGENT CARE | Facility: CLINIC | Age: 56
End: 2021-08-06
Payer: COMMERCIAL

## 2021-08-06 VITALS
SYSTOLIC BLOOD PRESSURE: 140 MMHG | BODY MASS INDEX: 31.07 KG/M2 | DIASTOLIC BLOOD PRESSURE: 77 MMHG | HEIGHT: 70 IN | HEART RATE: 101 BPM | WEIGHT: 217 LBS | OXYGEN SATURATION: 98 % | RESPIRATION RATE: 18 BRPM | TEMPERATURE: 98 F

## 2021-08-06 DIAGNOSIS — S01.01XA LACERATION OF SCALP, INITIAL ENCOUNTER: Primary | ICD-10-CM

## 2021-08-06 PROCEDURE — 90715 TDAP VACCINE 7 YRS/> IM: CPT

## 2021-08-06 PROCEDURE — 90471 IMMUNIZATION ADMIN: CPT

## 2021-08-06 PROCEDURE — 99202 OFFICE O/P NEW SF 15 MIN: CPT | Performed by: PHYSICIAN ASSISTANT

## 2021-08-06 PROCEDURE — 12001 RPR S/N/AX/GEN/TRNK 2.5CM/<: CPT | Performed by: PHYSICIAN ASSISTANT

## 2021-08-06 NOTE — PROGRESS NOTES
[facility]        NAME: Natali Carnes is a 55 y o  male  : 1965    MRN: [id number]  DATE: 2021  TIME: 4:15 PM    Assessment and Plan   Laceration of scalp, initial encounter [S01 01XA]  1  Laceration of scalp, initial encounter  Tdap Vaccine greater than or equal to 8yo         Patient Instructions     Patient Instructions     Cleaned and closed with Dermabond  Very superficial   Keep dry today  Tetanus vaccine administered  Follow up with PCP in 3-5 days  Proceed to  ER if symptoms worsen  Chief Complaint     Chief Complaint   Patient presents with    Laceration     scalp laceration hit head on metal in basement         History of Present Illness         52-year-old male presents the clinic with a laceration to his scalp today  He was working the basement he stood up hit his head on a piece of metal on the shelf  No loss of conscious  No headache  Not sure when his last tetanus shot was  No nausea vomiting  No dizziness  Review of Systems   Review of Systems   Constitutional: Negative  HENT: Negative  Respiratory: Negative  Cardiovascular: Negative  Gastrointestinal: Negative  Skin: Positive for wound  Neurological: Negative for dizziness, seizures, facial asymmetry, light-headedness, numbness and headaches  Current Medications       Current Outpatient Medications:     cyclobenzaprine (FLEXERIL) 10 mg tablet, Take 1 tablet (10 mg total) by mouth 3 (three) times a day as needed for muscle spasms, Disp: 20 tablet, Rfl: 0    hydrocortisone 2 5 % ointment, Apply topically 2 (two) times a day for 14 days Applied to bee sting areas    Avoid contact with eye , Disp: 28 35 g, Rfl: 0    metFORMIN (GLUCOPHAGE) 1000 MG tablet, TAKE 1 TABLET 2 TIMES A DAY WITH MEALS (Patient not taking: Reported on 2021), Disp: 60 tablet, Rfl: 1    metoprolol succinate (TOPROL-XL) 25 mg 24 hr tablet, TAKE 1 TABLET (25 MG TOTAL) BY MOUTH DAILY (Patient not taking: Reported on 8/6/2021), Disp: 30 tablet, Rfl: 2    naproxen (EC NAPROSYN) 500 MG EC tablet, Take 1 tablet (500 mg total) by mouth 2 (two) times a day with meals (Patient not taking: Reported on 8/17/2018 ), Disp: 20 tablet, Rfl: 0    Current Allergies     Allergies as of 08/06/2021 - Reviewed 08/06/2021   Allergen Reaction Noted    Sulfa antibiotics  02/08/2007            The following portions of the patient's history were reviewed and updated as appropriate: allergies, current medications, past family history, past medical history, past social history, past surgical history and problem list      Past Medical History:   Diagnosis Date    Chest pain     last assessed 1/30/14    Diabetes mellitus ([facility] [address] )     Hypertension        No past surgical history on file  Family History   Problem Relation Age of Onset    Parkinsonism Father     No Known Problems Mother          Medications have been verified  Objective   /77   Pulse 101   Temp 98 °F (36 7 °C)   Resp 18   Ht 5' 10" (1 778 m)   Wt 98 4 kg (217 lb)   SpO2 98%   BMI 31 14 kg/m²          Physical Exam     Physical Exam  Constitutional:       General: He is not in acute distress  Appearance: He is well-developed  HENT:      Head: Normocephalic and atraumatic  Cardiovascular:      Rate and Rhythm: Normal rate  Pulmonary:      Effort: Pulmonary effort is normal    Skin:     General: Skin is warm and dry  Comments:   Over the crown of head there is a 2 cm irregular superficial skin tear  I am not able to see any gapping or widening of the wound  No active bleeding  This was clean with peroxide and covered with Dermabond  Neurological:      General: No focal deficit present  Mental Status: He is alert and oriented to person, place, and time  Cranial Nerves: Cranial nerves are intact  No cranial nerve deficit  Coordination: Romberg sign negative   Coordination normal  Finger-Nose-Finger Test normal  Gait: Gait normal        Laceration repair    Date/Time: 8/6/2021 4:15 PM  Performed by: Kandi Flores PA-C  Authorized by: Kandi Flores PA-C   Consent given by: patient and spouse  Body area: head/neck  Location details: scalp  Laceration length: 2 cm  Foreign bodies: no foreign bodies  Tendon involvement: none  Nerve involvement: none  Vascular damage: no      Procedure Details:  Amount of cleaning: standard  Skin closure: glue

## 2023-01-19 ENCOUNTER — OFFICE VISIT (OUTPATIENT)
Dept: URGENT CARE | Facility: CLINIC | Age: 58
End: 2023-01-19

## 2023-01-19 VITALS
SYSTOLIC BLOOD PRESSURE: 182 MMHG | HEART RATE: 89 BPM | RESPIRATION RATE: 18 BRPM | DIASTOLIC BLOOD PRESSURE: 105 MMHG | BODY MASS INDEX: 31.35 KG/M2 | WEIGHT: 219 LBS | TEMPERATURE: 98.2 F | HEIGHT: 70 IN | OXYGEN SATURATION: 100 %

## 2023-01-19 DIAGNOSIS — M54.9 TRIGGER POINT WITH BACK PAIN: Primary | ICD-10-CM

## 2023-01-19 DIAGNOSIS — G25.89 SCAPULAR DYSKINESIS: ICD-10-CM

## 2023-01-20 NOTE — PROGRESS NOTES
[facility]        NAME: Naatn Salazar is a 65 y o  male  : 1965    MRN: [id number]  DATE: 2023  TIME: 7:36 PM    Assessment and Plan   Trigger point with back pain [M54 9]  1  Trigger point with back pain        2  Scapular dyskinesis              Patient Instructions     EKG revealed NSR with rate of 100  Back pain secondary to serratus trigger point  OMT completed today with good results  Follow up with PCP in 3-5 days if no improvement  Proceed to  ER if symptoms worsen  Chief Complaint     Chief Complaint   Patient presents with   • scapula pain     Pain on left side of back at scapula unrelieved by OTC meds  Using heat, stretching some relief -best in the shower but pain radiated in front of shoulder and chest  x 1 week duration         History of Present Illness       Back Pain  This is a new problem  The current episode started in the past 7 days  The problem occurs constantly  The problem has been gradually worsening since onset  The pain is present in the thoracic spine  The quality of the pain is described as burning  Radiates to: left arm  The pain is mild  The pain is the same all the time  The symptoms are aggravated by sitting  Pertinent negatives include no abdominal pain, chest pain, dysuria, fever, headaches, numbness, paresthesias, tingling or weakness  Review of Systems   Review of Systems   Constitutional: Negative for chills, fatigue and fever  HENT: Negative for postnasal drip and sore throat  Respiratory: Negative for cough and shortness of breath  Cardiovascular: Negative for chest pain and palpitations  Gastrointestinal: Negative for abdominal pain, nausea and vomiting  Genitourinary: Negative for dysuria  Musculoskeletal: Positive for back pain  Negative for gait problem and joint swelling  Skin: Negative for rash     Neurological: Negative for dizziness, tingling, syncope, weakness, light-headedness, numbness, headaches and paresthesias  Psychiatric/Behavioral: Negative for agitation and confusion  All other systems reviewed and are negative  Current Medications       Current Outpatient Medications:   •  cyclobenzaprine (FLEXERIL) 10 mg tablet, Take 1 tablet (10 mg total) by mouth 3 (three) times a day as needed for muscle spasms, Disp: 20 tablet, Rfl: 0  •  hydrocortisone 2 5 % ointment, Apply topically 2 (two) times a day for 14 days Applied to bee sting areas  Avoid contact with eye , Disp: 28 35 g, Rfl: 0  •  metFORMIN (GLUCOPHAGE) 1000 MG tablet, TAKE 1 TABLET 2 TIMES A DAY WITH MEALS (Patient not taking: No sig reported), Disp: 60 tablet, Rfl: 1  •  metoprolol succinate (TOPROL-XL) 25 mg 24 hr tablet, TAKE 1 TABLET (25 MG TOTAL) BY MOUTH DAILY (Patient not taking: Reported on 8/6/2021), Disp: 30 tablet, Rfl: 2  •  naproxen (EC NAPROSYN) 500 MG EC tablet, Take 1 tablet (500 mg total) by mouth 2 (two) times a day with meals (Patient not taking: Reported on 8/17/2018 ), Disp: 20 tablet, Rfl: 0    Current Allergies     Allergies as of 01/19/2023 - Reviewed 01/19/2023   Allergen Reaction Noted   • Sulfa antibiotics  02/08/2007            The following portions of the patient's history were reviewed and updated as appropriate: allergies, current medications, past family history, past medical history, past social history, past surgical history and problem list      Past Medical History:   Diagnosis Date   • Chest pain     last assessed 1/30/14   • Diabetes mellitus ([facility] [address] )    • Hypertension        History reviewed  No pertinent surgical history  Family History   Problem Relation Age of Onset   • Parkinsonism Father    • No Known Problems Mother          Medications have been verified  Objective   BP (!) 182/105   Pulse 89   Temp 98 2 °F (36 8 °C)   Resp 18   Ht 5' 10" (1 778 m)   Wt 99 3 kg (219 lb)   SpO2 100%   BMI 31 42 kg/m²   No LMP for male patient  Physical Exam     Physical Exam  Vitals reviewed  Constitutional:       General: He is not in acute distress  Appearance: Normal appearance  He is not ill-appearing  HENT:      Head: Normocephalic and atraumatic  Eyes:      Extraocular Movements: Extraocular movements intact  Conjunctiva/sclera: Conjunctivae normal    Musculoskeletal:      Cervical back: Normal range of motion  Thoracic back: Spasms and tenderness present  No swelling or bony tenderness  Back:    Skin:     General: Skin is warm  Neurological:      General: No focal deficit present  Mental Status: He is alert  Psychiatric:         Mood and Affect: Mood normal          Behavior: Behavior normal          Judgment: Judgment normal        EKG: NSR with rate 100  OSTEOPATHIC MANIPULATIVE TREATMENT      Patient ID: Cipriano Bishop is a 65 y o  male     Findings: serratus trigger point     Treatment: FDM     Treatment Outcome: pain reduction     A chaperone (MARISOL Chau) was present throughout all aspects of this treatment  Risks and benefits were discussed with the patient and verbal consent was obtained  There was an excellent technical outcome with no complications  The patient noted an improvement in symptoms and ambulated out of the office at conclusion of the visit  Post-treatment precautions were discussed, including hydration and NSAIDs or Tylenol as needed for soreness, and possible bruising  It is advisable to refrain from intense activity over the next 24-48 hours; however, a mild to moderate amount of exercise is recommended during this time  If pain is worsening beyond expected soreness, patient should call or return to the office for further advice

## 2023-01-22 LAB
ATRIAL RATE: 93 BPM
P AXIS: 57 DEGREES
PR INTERVAL: 166 MS
QRS AXIS: 5 DEGREES
QRSD INTERVAL: 86 MS
QT INTERVAL: 360 MS
QTC INTERVAL: 447 MS
T WAVE AXIS: 44 DEGREES
VENTRICULAR RATE: 93 BPM

## 2023-03-13 ENCOUNTER — EVALUATION (OUTPATIENT)
Dept: PHYSICAL THERAPY | Facility: CLINIC | Age: 58
End: 2023-03-13

## 2023-03-13 DIAGNOSIS — M54.6 ACUTE LEFT-SIDED THORACIC BACK PAIN: Primary | ICD-10-CM

## 2023-03-13 NOTE — PROGRESS NOTES
PT Evaluation     Today's date: 3/13/2023  Patient name: Romain Molina  : 1965  MRN: [id number]  Referring provider: Self, Referral  Dx:   Encounter Diagnosis     ICD-10-CM    1  Acute left-sided thoracic back pain  M54 6                      Assessment  Assessment details: Romain Molina is a 65 y o  male who presents with complaints of Acute left-sided thoracic back pain  (primary encounter diagnosis)  No further referral appears necessary at this time based upon examination results  Patient is presenting with decreased cervical mobility and cervical ROM with symptoms increased with head movements and decreased with scapular retraction  Currently, symptoms leading to limitations with sleeping, reaching, driving, working, ADLs, and completing daily tasks  Prognosis is good given HEP compliance and PT 1-2x/wk  Positive prognostic indicators include positive attitude toward recovery  Please contact me if you have any questions or recommendations  Thank you for the opportunity to share in [facility] care  Impairments: abnormal muscle firing, abnormal muscle tone, abnormal or restricted ROM, abnormal movement, impaired physical strength, lacks appropriate home exercise program, pain with function and poor posture     Symptom irritability: moderateUnderstanding of Dx/Px/POC: good   Prognosis: good    Plan  Plan details: Direct access for 30 days  Patient would benefit from: skilled physical therapy  Planned therapy interventions: joint mobilization, manual therapy, patient education, postural training, strengthening, stretching, therapeutic activities, therapeutic exercise, home exercise program, neuromuscular re-education, flexibility and functional ROM exercises  Frequency: 1-2x    Duration in weeks: 4  Treatment plan discussed with: patient        Subjective Evaluation    History of Present Illness  Mechanism of injury: Patient reports waking with severe left scapular pain that did not improve after a week  About 1 week later went to Urgent care and was diagnosed with trigger point with MFR being performed  Occasionally will have symptoms radiate down his left arm  After about 1-2 days symptoms were back to normal  Went for massage 1 week later and had [employer] work on that area  Reports having to brake up dog fight at home that caused symptoms to return  Went away for 2 weeks and then flared up again  [facility] and [facility] Flexeril and naproxen without any improvement in symptoms  No pain with coughing or sneezing now with inhalation  Occasionally will feel like his arm is dead  Symptoms will increase with sitting, sleeping, and driving  Does increase when turning head to left  Arm above head makes symptoms feel better  Patient denies dizziness, dsyphagia, dysarthria, diplopia, drop attacks, nystagmus, facial numbness, nor nausea  Reports about 8-10 years ago having to get shoulder injection into left shoulder  Symptoms described as stabbing and burning  Not a recurrent problem   Quality of life: good    Pain  Current pain rating: 3  At best pain ratin  At worst pain ratin  Quality: burning, throbbing and sharp  Relieving factors: heat  Exacerbated by: slouching  Progression: no change    Hand dominance: right    Patient Goals  Patient goals for therapy: decreased pain, increased strength and independence with ADLs/IADLs      Short Term:  1  Pt will report decreased levels of pain by at least 2 subjective ratings in 4 weeks  2  Pt will demonstrate improved ROM by at least 10 degrees in 4 weeks  3  Pt will demonstrate improved strength by 1/2 grade in 4 weeks  4  Pt will be able to sit> 30 minutes without pain in 4 weeks  5  Pt will be able to sleep without disturbances in 2 weeks      Objective    Cervical % of normal  strength   Flex  Extn       SB Left     SB Right     ROT Left     ROT Right     Repetitive testing: retraction= no better  Retraction with extension= no better  Thoracic extension= no better  scap retraction= +  Flexion= no effect             MMT    Shoulder       R       L   Flex  5 5   Extn  5 5   Abd  5 5   Add  5 5   IR  5 5   ER  5 5        Low Trap 4- 4-   Mid Trap 4- 4-                     MMT    Elbow       R       L   Flex  5 5   Extn  5 5              MMT    Wrist       R         L   Flex  5 5   Extn  5 5    bent 5 5     Head positioning: moderate forward head posture  Palpation: Left 5th rib   Scapular positioning: bilateral abduction  Scapular Assistance Test: retraction improves symptoms        ULTT’s:  See the following    Ulnar: L=  +      R= -      Radial: L=  -    R=  -      Medial: L=   -   R=  -  Vertebral artery test=  - supine transverse ligament=  - Alar ligament= -   Spurling’s= + Left    Headaches =  +         Cervical joint mobility: hypomobile C3-5 left    Rib mobility: decreased anterior left 5th rib        Insurance:  AMA/CMS Eval/ Re-eval POC expires Esme Hsu #/ Referral # Total    Start date  Expiration date Extension  Visit limitation? PT only or  PT+OT? Co-Insurance   CMS 3 13 4 12 23  none                            Precautions: HTN, DM  Patient provided verbal consent to treatment plan and recommended interventions  Manuals 3 13  P/a rib 5 gr   5 FB                                   Neuro Re-Ed                                                                        Ther Ex         Cerv  retra 2*10        scap retract 3*10        Pt edu FB                                                     Ther Activity

## 2023-03-16 ENCOUNTER — OFFICE VISIT (OUTPATIENT)
Dept: PHYSICAL THERAPY | Facility: CLINIC | Age: 58
End: 2023-03-16

## 2023-03-16 DIAGNOSIS — M54.6 ACUTE LEFT-SIDED THORACIC BACK PAIN: Primary | ICD-10-CM

## 2023-03-16 NOTE — PROGRESS NOTES
Daily Note     Today's date: 3/16/2023  Patient name: Hilaria Bowling  : 1965  MRN: [id number]  Referring provider: Self, Referral  Dx:   Encounter Diagnosis     ICD-10-CM    1  Acute left-sided thoracic back pain  M54 6                  Subjective: Patient reports that he feels better with proper sitting posture  1 on 1 with PT for 23 minutes  Remaining time independent fitness program     Objective: See treatment diary below    Assessment: Tolerated treatment well  Patient reported slight improvement in cervical rotation and left lateral flexion following left cervical rotation with towel  Symptoms increased with repeated cervical retraction with extension  Plan: Continue per plan of care  Insurance:  AMA/CMS Eval/ Re-eval POC expires Alison Sandrita #/ Referral # Total    Start date  Expiration date Extension  Visit limitation? PT only or  PT+OT? Co-Insurance   CMS 3 13 4 12 23  none                            Precautions: HTN, DM  Patient provided verbal consent to treatment plan and recommended interventions  Manuals 3 13  3 16       P/a rib 5 gr  5 FB        T-spine extn  2*10       Cerv  Retraction OP  5x pain                                                    Neuro Re-Ed                                                                        Ther Ex         Cerv  retra 2*10 1*10       scap retract 3*10 3*10       Pt edu FB 5'       B/L shld ER  3*10 blue TB       t-spine extn rep   3*10       Rep   cerv extn  10x       Left rot snags  4*10                                                                      Ther Activity

## 2023-03-20 ENCOUNTER — OFFICE VISIT (OUTPATIENT)
Dept: PHYSICAL THERAPY | Facility: CLINIC | Age: 58
End: 2023-03-20

## 2023-03-20 DIAGNOSIS — M54.6 ACUTE LEFT-SIDED THORACIC BACK PAIN: Primary | ICD-10-CM

## 2023-03-20 NOTE — PROGRESS NOTES
Daily Note     Today's date: 3/20/2023  Patient name: Yuliet Elkins  : 1965  MRN: [id number]  Referring provider: Self, Referral  Dx:   Encounter Diagnosis     ICD-10-CM    1  Acute left-sided thoracic back pain  M54 6                  Subjective: Patient reports that he was standing and felt pain Saturday night, but reports today is not that bad      1 on 1 with PT for 23 minutes  Remaining time independent fitness program     Objective: See treatment diary below  Pain provoked with passive and active left cervical side bend  Hypomobile C6-7    Assessment: Tolerated treatment well  Patient reported improvement after manual intervention  Reported that he felt less sore after posterior chain based strengthening  Plan: Continue per plan of care  Insurance:  AMA/CMS Eval/ Re-eval POC expires Al Rogue #/ Referral # Total    Start date  Expiration date Extension  Visit limitation? PT only or  PT+OT? Co-Insurance   CMS 3 13 4 12 23  none                            Precautions: HTN, DM  Patient provided verbal consent to treatment plan, grade 5 joint mobilization, and recommended interventions  HEP: stand horz abd with blue TB, B/L ER Blue TB    Manuals 3 13  3 16 3 20      P/a rib 5 gr  5 FB        T-spine extn  2*10       Cerv  Retraction OP  5x pain       C6-7 gr  5   FB                                          Neuro Re-Ed                                                                        Ther Ex         Cerv  retra 2*10 1*10 1*10      scap retract 3*10 3*10       Pt edu FB 5'       B/L shld ER  3*10 blue TB 3*10 blue TB      t-spine extn rep   3*10       Rep   cerv extn  10x       Left rot snags  4*10       rows   3*10 Blue TB      B/L shld extn   3*10 Blue TB      Horz  abd    3*10 Blue TB                                          Ther Activity

## 2023-03-23 ENCOUNTER — APPOINTMENT (OUTPATIENT)
Dept: PHYSICAL THERAPY | Facility: CLINIC | Age: 58
End: 2023-03-23

## 2023-03-28 ENCOUNTER — OFFICE VISIT (OUTPATIENT)
Dept: PHYSICAL THERAPY | Facility: CLINIC | Age: 58
End: 2023-03-28

## 2023-03-28 DIAGNOSIS — M54.6 ACUTE LEFT-SIDED THORACIC BACK PAIN: Primary | ICD-10-CM

## 2023-03-28 NOTE — PROGRESS NOTES
Daily Note     Today's date: 3/28/2023  Patient name: Reena Garza  : 1965  MRN: [id number]  Referring provider: Self, Referral  Dx:   Encounter Diagnosis     ICD-10-CM    1  Acute left-sided thoracic back pain  M54 6         Start Time: 1720  Stop Time: 1800  Total time in clinic (min): 40 minutes  Subjective: Patient reports that he has been feeling much better overall  Objective: See treatment diary below  Pain provoked active left cervical side bend  Hypomobile C5-7    Assessment: Tolerated treatment well  Patient reported no pain with left cervical rotation after manual intervention  Good tolerance to strengthening with band progression today  Plan: Continue per plan of care  Insurance:  AMA/CMS Eval/ Re-eval POC expires [employer] [employer] #/ Referral # Total    Start date  Expiration date Extension  Visit limitation? PT only or  PT+OT? Co-Insurance   CMS 3 13 4 12 23  none                            Precautions: HTN, DM  Patient provided verbal consent to treatment plan, grade 5 joint mobilization, and recommended interventions  HEP: stand horz abd with blue TB, B/L ER Blue TB    Manuals 3 13  3 16 3 20 3 28     P/a rib 5 gr  5 FB        T-spine extn  2*10       Cerv  Retraction OP  5x pain       C6-7 gr  5   FB FB                                         Neuro Re-Ed                                                                        Ther Ex         Cerv  retra 2*10 1*10 1*10      scap retract 3*10 3*10       Pt edu FB 5'       B/L shld ER  3*10 blue TB 3*10 blue TB 3*10 Blue TB     t-spine extn rep   3*10       Rep   cerv extn  10x       Left rot snags  4*10  10x     rows   3*10 Blue TB 3*10 Black TB     B/L shld extn   3*10 Blue TB 3*10 Black TB     Horz  abd    3*10 Blue TB 3*10 Blue TB                                         Ther Activity

## 2023-03-30 ENCOUNTER — OFFICE VISIT (OUTPATIENT)
Dept: PHYSICAL THERAPY | Facility: CLINIC | Age: 58
End: 2023-03-30

## 2023-03-30 DIAGNOSIS — M54.6 ACUTE LEFT-SIDED THORACIC BACK PAIN: Primary | ICD-10-CM

## 2023-03-30 NOTE — PROGRESS NOTES
Daily Note     Today's date: 3/30/2023  Patient name: Sina Gardner  : 1965  MRN: [id number]  Referring provider: Self, Referral  Dx:   Encounter Diagnosis     ICD-10-CM    1  Acute left-sided thoracic back pain  M54 6         Start Time: 1410  Stop Time: 1450  Total time in clinic (min): 40 minutes  Subjective: Patient reports that he has some soreness in left levator region entering treatment noticeable with left sidebending  Objective: See treatment diary below  Pain provoked active and passive left cervical side bend    Assessment: Tolerated treatment well  Patient reported feeling better after repeated cervical retraction with self and clinician overpressure with ROM improved  Moderate cueing needed for proper exercise performance  Plan: Continue per plan of care  Insurance:  AMA/CMS Eval/ Re-eval POC expires Wen Almeida #/ Referral # Total    Start date  Expiration date Extension  Visit limitation? PT only or  PT+OT? Co-Insurance   CMS 3 13 4 12 23  none                            Precautions: HTN, DM  Patient provided verbal consent to treatment plan, grade 5 joint mobilization, and recommended interventions  HEP: stand horz abd with blue TB, B/L ER Blue TB    Manuals 3 13  3 16 3 20 3 28 3 30    P/a rib 5 gr  5 FB        T-spine extn  2*10       Cerv   Retraction OP  5x pain   2*10    C6-7 gr  5   FB FB     MFR ins  levator     FB                               Neuro Re-Ed                                    Ther Ex         Cerv  retra 2*10 1*10 1*10  2*10    cerv retract with OP     2*10    scap retract 3*10 3*10       Pt edu FB 5'       B/L shld ER  3*10 blue TB 3*10 blue TB 3*10 Blue TB     t-spine extn rep   3*10   10x    Rep  cerv extn  10x   10x    Left rot snags  4*10  10x     rows   3*10 Blue TB 3*10 Black TB 3*10, 22 5# CC    B/L shld extn   3*10 Blue TB 3*10 Black TB 3*10 12 5# CC    Horz  abd    3*10 Blue TB 3*10 Blue TB     Rep retract with extn     10x Ther Activity

## 2023-04-03 ENCOUNTER — OFFICE VISIT (OUTPATIENT)
Dept: PHYSICAL THERAPY | Facility: CLINIC | Age: 58
End: 2023-04-03

## 2023-04-03 DIAGNOSIS — M54.6 ACUTE LEFT-SIDED THORACIC BACK PAIN: Primary | ICD-10-CM

## 2023-04-03 NOTE — PROGRESS NOTES
Daily Note     Today's date: 4/3/2023  Patient name: Tracie Monroy  : 1965  MRN: [id number]  Referring provider: Self, Referral  Dx:   Encounter Diagnosis     ICD-10-CM    1  Acute left-sided thoracic back pain  M54 6                  Subjective: Patient reports that is noticing symptoms less frequently and more tied to his posture  He notices more with driving or when sitting with forward head posture  Objective: See treatment diary below  Pain provoked active and passive left cervical side bend    Assessment: Tolerated treatment well  Patient reported feeling improved with repeated cervical retraction which improved left lateral flexion  Patient to be seen for 1 additional visit next week with likely discharge at that time  Plan: Continue per plan of care  Insurance:  AMA/CMS Eval/ Re-eval POC expires Dandre Kang #/ Referral # Total    Start date  Expiration date Extension  Visit limitation? PT only or  PT+OT? Co-Insurance   CMS 3 13 4 12 23  none                          Precautions: HTN, DM  Patient provided verbal consent to treatment plan, grade 5 joint mobilization, and recommended interventions  HEP: stand horz abd with blue TB, B/L ER Blue TB    Manuals 3 16 3 20 3 28 3 30 4 3 23   P/a rib 5 gr  5        T-spine extn 2*10       Cerv   Retraction OP 5x pain   2*10    C6-7 gr  5  FB FB     MFR ins  levator    FB                            Neuro Re-Ed                                Ther Ex        Cerv  retra 1*10 1*10  2*10 2*10   cerv retract with OP    2*10 2*10   t-spine extn rep  3*10   10x    Rep  cerv extn 10x   10x    Left rot snags 4*10  10x     rows  3*10 Blue TB 3*10 Black TB 3*10, 22 5# CC 3*10, 22 5# CC   B/L shld extn  3*10 Blue TB 3*10 Black TB 3*10 12 5# CC 3*10 17 5# CC   Horz  abd   3*10 Blue TB 3*10 Blue TB     Rep retract with extn    10x    B/L shld facepull     3*10, 17 5#                   Ther Activity

## 2023-04-06 ENCOUNTER — APPOINTMENT (OUTPATIENT)
Dept: PHYSICAL THERAPY | Facility: CLINIC | Age: 58
End: 2023-04-06

## 2023-07-18 ENCOUNTER — OFFICE VISIT (OUTPATIENT)
Dept: OBGYN CLINIC | Facility: CLINIC | Age: 58
End: 2023-07-18
Payer: COMMERCIAL

## 2023-07-18 ENCOUNTER — APPOINTMENT (OUTPATIENT)
Dept: RADIOLOGY | Facility: CLINIC | Age: 58
End: 2023-07-18
Payer: COMMERCIAL

## 2023-07-18 VITALS
SYSTOLIC BLOOD PRESSURE: 183 MMHG | BODY MASS INDEX: 31.35 KG/M2 | WEIGHT: 219 LBS | HEART RATE: 86 BPM | DIASTOLIC BLOOD PRESSURE: 101 MMHG | HEIGHT: 70 IN

## 2023-07-18 DIAGNOSIS — M89.8X1 PAIN OF LEFT SCAPULA: Primary | ICD-10-CM

## 2023-07-18 DIAGNOSIS — M25.512 LEFT SHOULDER PAIN, UNSPECIFIED CHRONICITY: ICD-10-CM

## 2023-07-18 DIAGNOSIS — M25.511 ACUTE PAIN OF RIGHT SHOULDER: ICD-10-CM

## 2023-07-18 PROCEDURE — 99214 OFFICE O/P EST MOD 30 MIN: CPT | Performed by: PHYSICIAN ASSISTANT

## 2023-07-18 PROCEDURE — 73030 X-RAY EXAM OF SHOULDER: CPT

## 2023-07-18 NOTE — PROGRESS NOTES
Assessment/Plan:  1. Pain of left scapula  XR shoulder 2+ vw left    MRI arthrogram left shoulder      2. Left shoulder pain, unspecified chronicity  MRI shoulder left wo contrast        As the patient has had ongoing posterior shoulder pain since January and has failed extensive conservative treatment thus far, I do feel an MRI of the scapula is warranted to further evaluate. If no abnormalities found, we discussed possible referral to pain management for trigger point injections. The patient's shoulder strength is quite good today, and thus I do not suspect a rotator cuff tear at this point. He will follow-up after his MRI to discuss the results and how to proceed. Subjective:   Sangeeta Oliver is a 63 y.o. male who presents today for evaluation of his right shoulder. The patient notes he started with some posterior shoulder pain in January, with no inciting event prior to the onset of his symptoms. He has been to to urgent cares and has done a course of physical therapy. He notes that therapy seem to help some, however then he was stretching his arms behind his head about a week ago and his pain returned. He complains of pain localized to the posterior shoulder in the region of the scapula. This seems to be worse with activity and better with rest.  He still notes fair range of motion of the shoulder and denies any paresthesias of the left upper extremity. He has no history of any cervical spine issues. Review of Systems   Constitutional: Negative. Negative for chills and fever. HENT: Negative. Negative for ear pain and sore throat. Eyes: Negative. Negative for pain and redness. Respiratory: Negative. Negative for shortness of breath and wheezing. Cardiovascular: Negative for chest pain and palpitations. Gastrointestinal: Negative. Negative for abdominal pain and blood in stool. Endocrine: Negative. Negative for polydipsia and polyuria. Genitourinary: Negative.   Negative for difficulty urinating and dysuria. Musculoskeletal:        As noted in HPI   Skin: Negative. Negative for pallor and rash. Neurological: Negative. Negative for dizziness and numbness. Hematological: Negative. Negative for adenopathy. Does not bruise/bleed easily. Psychiatric/Behavioral: Negative. Negative for confusion and suicidal ideas. Past Medical History:   Diagnosis Date   • Chest pain     last assessed 1/30/14   • Diabetes mellitus ([address])    • Hypertension        History reviewed. No pertinent surgical history. Family History   Problem Relation Age of Onset   • Parkinsonism Father    • No Known Problems Mother        Social History     Occupational History   • Not on file   Tobacco Use   • Smoking status: Former     Types: Cigars   • Smokeless tobacco: Never   • Tobacco comments:     chews Nicorette   Vaping Use   • Vaping Use: Never used   Substance and Sexual Activity   • Alcohol use: Yes     Comment: rarely   • Drug use: No   • Sexual activity: Not on file         Current Outpatient Medications:   •  cyclobenzaprine (FLEXERIL) 10 mg tablet, Take 1 tablet (10 mg total) by mouth 3 (three) times a day as needed for muscle spasms (Patient not taking: Reported on 3/13/2023), Disp: 20 tablet, Rfl: 0  •  hydrocortisone 2.5 % ointment, Apply topically 2 (two) times a day for 14 days Applied to bee sting areas.   Avoid contact with eye., Disp: 28.35 g, Rfl: 0  •  metFORMIN (GLUCOPHAGE) 1000 MG tablet, TAKE 1 TABLET 2 TIMES A DAY WITH MEALS (Patient not taking: No sig reported), Disp: 60 tablet, Rfl: 1  •  metoprolol succinate (TOPROL-XL) 25 mg 24 hr tablet, TAKE 1 TABLET (25 MG TOTAL) BY MOUTH DAILY (Patient not taking: Reported on 8/6/2021), Disp: 30 tablet, Rfl: 2  •  naproxen (EC NAPROSYN) 500 MG EC tablet, Take 1 tablet (500 mg total) by mouth 2 (two) times a day with meals (Patient not taking: Reported on 8/17/2018), Disp: 20 tablet, Rfl: 0    Allergies   Allergen Reactions   • Sulfa Antibiotics      Reaction Date: [id number];        Objective:  Vitals:    07/18/23 1510   BP: (!) 183/101   Pulse: 86            Left Shoulder Exam     Tenderness   Left shoulder tenderness location: Tenderness inferior scapula. Range of Motion   External rotation: 80   Forward flexion: 180   Internal rotation 0 degrees: T12     Muscle Strength   Abduction: 5/5   Internal rotation: 5/5   External rotation: 5/5     Tests   Apprehension: negative  Sanchez test: negative  Cross arm: positive  Impingement: negative  Drop arm: negative    Other   Erythema: absent  Sensation: normal  Pulse: present             Physical Exam  Constitutional:       General: He is not in acute distress. Appearance: He is well-developed. HENT:      Head: Normocephalic and atraumatic. Eyes:      General: No scleral icterus. Conjunctiva/sclera: Conjunctivae normal.   Neck:      Vascular: No JVD. Cardiovascular:      Rate and Rhythm: Normal rate. Pulmonary:      Effort: Pulmonary effort is normal. No respiratory distress. Skin:     General: Skin is warm. Neurological:      Mental Status: He is alert and oriented to person, place, and time. Coordination: Coordination normal.         I have personally reviewed pertinent films in PACS and my interpretation is as follows:  X-rays left shoulder: No acute osseous abnormality      This document was created using speech voice recognition software. Grammatical errors, random word insertions, pronoun errors, and incomplete sentences are an occasional consequence of this system due to software limitations, ambient noise, and hardware issues. Any formal questions or concerns about content, text, or information contained within the body of this dictation should be directly addressed to the provider for clarification.

## 2023-08-08 ENCOUNTER — APPOINTMENT (OUTPATIENT)
Dept: RADIOLOGY | Facility: CLINIC | Age: 58
End: 2023-08-08
Payer: COMMERCIAL

## 2023-08-08 ENCOUNTER — CONSULT (OUTPATIENT)
Dept: PAIN MEDICINE | Facility: CLINIC | Age: 58
End: 2023-08-08
Payer: COMMERCIAL

## 2023-08-08 VITALS — WEIGHT: 222 LBS | BODY MASS INDEX: 31.85 KG/M2 | TEMPERATURE: 98.2 F

## 2023-08-08 DIAGNOSIS — M54.12 CERVICAL RADICULOPATHY: ICD-10-CM

## 2023-08-08 DIAGNOSIS — M54.9 MID BACK PAIN: ICD-10-CM

## 2023-08-08 DIAGNOSIS — G89.29 CHRONIC LEFT SHOULDER PAIN: ICD-10-CM

## 2023-08-08 DIAGNOSIS — M54.2 NECK PAIN: ICD-10-CM

## 2023-08-08 DIAGNOSIS — M54.2 NECK PAIN: Primary | ICD-10-CM

## 2023-08-08 DIAGNOSIS — M25.512 CHRONIC LEFT SHOULDER PAIN: ICD-10-CM

## 2023-08-08 DIAGNOSIS — M54.12 CERVICAL RADICULOPATHY: Primary | ICD-10-CM

## 2023-08-08 PROCEDURE — 72050 X-RAY EXAM NECK SPINE 4/5VWS: CPT

## 2023-08-08 PROCEDURE — 99204 OFFICE O/P NEW MOD 45 MIN: CPT | Performed by: PHYSICAL MEDICINE & REHABILITATION

## 2023-08-08 RX ORDER — GABAPENTIN 300 MG/1
300 CAPSULE ORAL
Qty: 30 CAPSULE | Refills: 1 | Status: SHIPPED | OUTPATIENT
Start: 2023-08-08

## 2023-08-08 NOTE — PROGRESS NOTES
Assessment:  1. Neck pain    2. Cervical radiculopathy    3. Chronic left shoulder pain    4. Mid back pain        Plan:    Mr. Adela Camarena is a pleasant 55-year-old male who presents to [facility] for initial evaluation regarding 8 months duration of isolated left shoulder, neck, mid back and radiating left arm pain unrelieved with conservative measures including home exercises and physical therapy. During today's evaluation he is demonstrating pain that is likely multifactorial in nature with the majority the pain appears to be generating from the cervical spine and thoracic spine. He has already tried physical therapy for at least 6 weeks in the last 6 months and continues with his home exercises for at least 3 days/week for the last several weeks again with no relief. At this time further diagnostic imaging will be beneficial and warranted and we will consider a cervical epidural steroid injection under fluoroscopy guidance. For now we will order a cervical x-ray and MRI cervical spine without contrast.  All questions answered, patient is agreeable with plan. We will follow-up with MRI results and discuss interventional approaches from there. History of Present Illness:    Ayla Arias is a 63 y.o. male who [facility] for initial evaluation of the above stated pain complaints. The patient has a past medical and chronic pain history as outlined in the assessment section. Patient presents for initial evaluation regarding 8 months duration of mid to low back pain with radicular symptoms into the left arm. Denies any significant inciting event or recent trauma. Today reports moderate to severe pain rated 8 out of 10 and interfere with activities. Pain is constant 100% of the time that is worse in the evening. Describes symptoms as burning, shooting, sharp pain. Also reports upper extremity weakness but denies dropping objects.   Does not use any durable medical equipment for ambulation. Symptoms are worse with standing, bending, sitting, walking. Reports no significant relief with home exercises and rest.  Denies tobacco use. Admits to occasional marijuana and alcohol use. Previously tried Percocet, over-the-counter Tylenol, Motrin with no relief. Presents today for initial evaluation. Review of Systems:    Review of Systems   Constitutional: Negative for chills and fatigue. HENT: Negative for ear pain, mouth sores and sinus pressure. Eyes: Negative for pain, redness and visual disturbance. Respiratory: Negative for shortness of breath and wheezing. Cardiovascular: Negative for chest pain and palpitations. Gastrointestinal: Negative for abdominal pain and nausea. Endocrine: Negative for polyphagia. Musculoskeletal: Positive for back pain and gait problem. Negative for arthralgias and neck pain. Skin: Negative for wound. Neurological: Positive for weakness. Negative for seizures. Psychiatric/Behavioral: Positive for sleep disturbance. Negative for dysphoric mood. Past Medical History:   Diagnosis Date   • Chest pain     last assessed 1/30/14   • Diabetes mellitus ([address])    • Hypertension        History reviewed. No pertinent surgical history.     Family History   Problem Relation Age of Onset   • Parkinsonism Father    • No Known Problems Mother        Social History     Occupational History   • Not on file   Tobacco Use   • Smoking status: Former     Types: Cigars   • Smokeless tobacco: Never   • Tobacco comments:     chews Nicorette   Vaping Use   • Vaping Use: Never used   Substance and Sexual Activity   • Alcohol use: Yes     Comment: rarely   • Drug use: No   • Sexual activity: Not on file         Current Outpatient Medications:   •  cyclobenzaprine (FLEXERIL) 10 mg tablet, Take 1 tablet (10 mg total) by mouth 3 (three) times a day as needed for muscle spasms (Patient not taking: Reported on 3/13/2023), Disp: 20 tablet, Rfl: 0  •  hydrocortisone 2.5 % ointment, Apply topically 2 (two) times a day for 14 days Applied to bee sting areas. Avoid contact with eye., Disp: 28.35 g, Rfl: 0  •  metFORMIN (GLUCOPHAGE) 1000 MG tablet, TAKE 1 TABLET 2 TIMES A DAY WITH MEALS (Patient not taking: Reported on 8/8/2023), Disp: 60 tablet, Rfl: 1  •  metoprolol succinate (TOPROL-XL) 25 mg 24 hr tablet, TAKE 1 TABLET (25 MG TOTAL) BY MOUTH DAILY (Patient not taking: Reported on 8/6/2021), Disp: 30 tablet, Rfl: 2  •  naproxen (EC NAPROSYN) 500 MG EC tablet, Take 1 tablet (500 mg total) by mouth 2 (two) times a day with meals (Patient not taking: Reported on 8/17/2018), Disp: 20 tablet, Rfl: 0    Allergies   Allergen Reactions   • Sulfa Antibiotics      Reaction Date: [id number];        Physical Exam:    Temp 98.2 °F (36.8 °C)   Wt 101 kg (222 lb)   BMI 31.85 kg/m²     Constitutional: normal, well developed, well nourished, alert, in no distress and non-toxic and no overt pain behavior.   Eyes: anicteric  HEENT: grossly intact  Neck: supple, symmetric, trachea midline and no masses   Pulmonary:even and unlabored  Cardiovascular:No edema or pitting edema present  Skin:Normal without rashes or lesions and well hydrated  Psychiatric:Mood and affect appropriate  Neurologic:Cranial Nerves II-XII grossly intact  Musculoskeletal:normal gait, tenderness to palpation left-sided cervical paraspinals and thoracic paraspinals, decreased active and passive range of motion with cervical flexion and extension limited by pain, MMT 5 out of 5 bilateral upper extremities, sensation gross intact to light touch, DTRs within normal limits, positive Spurling with radicular symptoms into the left arm    Imaging  MRI cervical spine without contrast    (Results Pending)   X-ray cervical spine complete 4 or 5 vw    (Results Pending)       Orders Placed This Encounter   Procedures   • MRI cervical spine without contrast   • X-ray cervical spine complete 4 or 5 vw

## 2024-02-07 ENCOUNTER — HOSPITAL ENCOUNTER (EMERGENCY)
Facility: HOSPITAL | Age: 59
Discharge: HOME | End: 2024-02-07
Attending: EMERGENCY MEDICINE
Payer: COMMERCIAL

## 2024-02-07 ENCOUNTER — APPOINTMENT (OUTPATIENT)
Dept: RADIOLOGY | Facility: HOSPITAL | Age: 59
End: 2024-02-07
Payer: COMMERCIAL

## 2024-02-07 VITALS
WEIGHT: 217.8 LBS | SYSTOLIC BLOOD PRESSURE: 183 MMHG | RESPIRATION RATE: 20 BRPM | HEART RATE: 96 BPM | TEMPERATURE: 98.9 F | OXYGEN SATURATION: 99 % | DIASTOLIC BLOOD PRESSURE: 92 MMHG | HEIGHT: 70 IN | BODY MASS INDEX: 31.18 KG/M2

## 2024-02-07 DIAGNOSIS — M77.8 EXTENSOR TENDINITIS OF HAND: Primary | ICD-10-CM

## 2024-02-07 DIAGNOSIS — M79.643 HAND PAIN: ICD-10-CM

## 2024-02-07 PROCEDURE — 99284 EMERGENCY DEPT VISIT MOD MDM: CPT | Performed by: EMERGENCY MEDICINE

## 2024-02-07 PROCEDURE — 99283 EMERGENCY DEPT VISIT LOW MDM: CPT

## 2024-02-07 PROCEDURE — 73130 X-RAY EXAM OF HAND: CPT

## 2024-02-07 RX ORDER — NAPROXEN 500 MG/1
500 TABLET ORAL 2 TIMES DAILY WITH MEALS
Qty: 30 TABLET | Refills: 0 | Status: SHIPPED | OUTPATIENT
Start: 2024-02-07

## 2024-02-07 RX ORDER — SENNOSIDES 8.6 MG
650 CAPSULE ORAL EVERY 8 HOURS PRN
Qty: 30 TABLET | Refills: 0 | Status: SHIPPED | OUTPATIENT
Start: 2024-02-07

## 2024-02-07 NOTE — ED PROVIDER NOTES
History  Chief Complaint   Patient presents with    Hand Injury     Pt reports accidentally hitting left hand on something a few months ago. Pt reports today left hand third finger knuckle has pain and swelling. Pt report unable to close hand.      HPI  Patient is a 58-year-old male presenting for evaluation of pain of the left third metatarsal with radiation up the dorsum of the hand.  Patient states that he banged the finger initially about 6 to 8 weeks ago, states that he has intermittently had pain since that time particularly when he bumps it on something.  Patient feels that it was worse today with significant pain extending the finger.  Patient denies associated redness, warmth, fevers, chills.  Patient denies any new trauma to the area.  Patient denies additional injury.  Prior to Admission Medications   Prescriptions Last Dose Informant Patient Reported? Taking?   cyclobenzaprine (FLEXERIL) 10 mg tablet   No No   Sig: Take 1 tablet (10 mg total) by mouth 3 (three) times a day as needed for muscle spasms   Patient not taking: Reported on 3/13/2023   gabapentin (NEURONTIN) 300 mg capsule   No No   Sig: Take 1 capsule (300 mg total) by mouth daily at bedtime   hydrocortisone 2.5 % ointment   No No   Sig: Apply topically 2 (two) times a day for 14 days Applied to bee sting areas.  Avoid contact with eye.   metFORMIN (GLUCOPHAGE) 1000 MG tablet   No No   Sig: TAKE 1 TABLET 2 TIMES A DAY WITH MEALS   Patient not taking: Reported on 8/8/2023   metoprolol succinate (TOPROL-XL) 25 mg 24 hr tablet   No No   Sig: TAKE 1 TABLET (25 MG TOTAL) BY MOUTH DAILY   Patient not taking: Reported on 8/6/2021   naproxen (EC NAPROSYN) 500 MG EC tablet   No No   Sig: Take 1 tablet (500 mg total) by mouth 2 (two) times a day with meals   Patient not taking: Reported on 8/17/2018      Facility-Administered Medications: None       Past Medical History:   Diagnosis Date    Chest pain     last assessed 1/30/14    Diabetes mellitus (HCC)      Hypertension        History reviewed. No pertinent surgical history.    Family History   Problem Relation Age of Onset    Parkinsonism Father     No Known Problems Mother      I have reviewed and agree with the history as documented.    E-Cigarette/Vaping    E-Cigarette Use Never User      E-Cigarette/Vaping Substances     Social History     Tobacco Use    Smoking status: Former     Types: Cigars    Smokeless tobacco: Never    Tobacco comments:     chews Nicorette   Vaping Use    Vaping status: Never Used   Substance Use Topics    Alcohol use: Yes     Comment: rarely    Drug use: No       Review of Systems   Musculoskeletal:  Positive for arthralgias (Third MCP). Negative for myalgias.   Neurological:  Negative for weakness and numbness.   All other systems reviewed and are negative.      Physical Exam  Physical Exam  Vitals and nursing note reviewed.   Constitutional:       General: He is not in acute distress.     Appearance: Normal appearance. He is not ill-appearing, toxic-appearing or diaphoretic.   HENT:      Head: Normocephalic and atraumatic.      Right Ear: External ear normal.      Left Ear: External ear normal.   Eyes:      General:         Right eye: No discharge.         Left eye: No discharge.   Pulmonary:      Effort: No respiratory distress.   Abdominal:      General: There is no distension.   Musculoskeletal:         General: No deformity.      Cervical back: Normal range of motion.      Comments: Normal-appearing left hand.  Significant tenderness along the extensor tendon.  No overlying erythema or warmth.  Able to flex and extend with pain.   Skin:     Findings: No lesion or rash.   Neurological:      Mental Status: He is alert and oriented to person, place, and time. Mental status is at baseline.   Psychiatric:         Mood and Affect: Mood and affect normal.         Vital Signs  ED Triage Vitals [02/07/24 1654]   Temperature Pulse Respirations Blood Pressure SpO2   98.9 °F (37.2 °C) 96 20  (!) 183/92 99 %      Temp Source Heart Rate Source Patient Position - Orthostatic VS BP Location FiO2 (%)   Tympanic Monitor Sitting Right arm --      Pain Score       8           Vitals:    02/07/24 1654   BP: (!) 183/92   Pulse: 96   Patient Position - Orthostatic VS: Sitting         Visual Acuity      ED Medications  Medications - No data to display    Diagnostic Studies  Results Reviewed       None                   XR hand 3+ views LEFT    (Results Pending)              Procedures  Procedures         ED Course                               SBIRT 22yo+      Flowsheet Row Most Recent Value   Initial Alcohol Screen: US AUDIT-C     1. How often do you have a drink containing alcohol? 1 Filed at: 02/07/2024 1659   2. How many drinks containing alcohol do you have on a typical day you are drinking?  0 Filed at: 02/07/2024 1659   3a. Male UNDER 65: How often do you have five or more drinks on one occasion? 0 Filed at: 02/07/2024 1659   3b. FEMALE Any Age, or MALE 65+: How often do you have 4 or more drinks on one occassion? 0 Filed at: 02/07/2024 1659   Audit-C Score 1 Filed at: 02/07/2024 1659   NASRA: How many times in the past year have you...    Used an illegal drug or used a prescription medication for non-medical reasons? Never Filed at: 02/07/2024 1659                      Medical Decision Making  I obtained history from the patient.  I ordered and independently interpreted plain films to evaluate for traumatic injury.  Patient with significant degree of underlying osteoarthritis without evidence of fracture or dislocation.  Patient with no exam evidence of cellulitis, septic joint, or flexor tenosynovitis.  Provided with ice.  Treated symptomatically with Naprosyn, acetaminophen, provided with hand surgery follow-up, discharged with return precautions.    Risk  OTC drugs.  Prescription drug management.             Disposition  Final diagnoses:   Extensor tendinitis of hand   Hand pain     Time reflects when  diagnosis was documented in both MDM as applicable and the Disposition within this note       Time User Action Codes Description Comment    2/7/2024  6:17 PM Maninder Barraza Add [M77.8] Extensor tendinitis of hand     2/7/2024  6:17 PM Maninder Barraza Add [M79.643] Hand pain           ED Disposition       ED Disposition   Discharge    Condition   Stable    Date/Time   Wed Feb 7, 2024 1815    Comment   Brett Flood discharge to home/self care.                   Follow-up Information       Follow up With Specialties Details Why Contact Info    Kavya Pardo MD Orthopedic Surgery, Hand Surgery   [address] [facility] [address], [address]  [facility] [address] [phone]      Temo Cardoso MD Orthopedic Surgery, Hand Surgery   [address] [place] [id number]  [phone]              Discharge Medication List as of 2/7/2024  6:18 PM        START taking these medications    Details   acetaminophen (TYLENOL) 650 mg CR tablet Take 1 tablet (650 mg total) by mouth every 8 (eight) hours as needed for mild pain, Starting Wed 2/7/2024, Normal      naproxen (Naprosyn) 500 mg tablet Take 1 tablet (500 mg total) by mouth 2 (two) times a day with meals, Starting Wed 2/7/2024, Normal           CONTINUE these medications which have NOT CHANGED    Details   cyclobenzaprine (FLEXERIL) 10 mg tablet Take 1 tablet (10 mg total) by mouth 3 (three) times a day as needed for muscle spasms, Starting Mon 6/3/2019, Normal      gabapentin (NEURONTIN) 300 mg capsule Take 1 capsule (300 mg total) by mouth daily at bedtime, Starting Tue 8/8/2023, Normal      hydrocortisone 2.5 % ointment Apply topically 2 (two) times a day for 14 days Applied to bee sting areas.  Avoid contact with eye., Starting Mon 9/28/2020, Until Mon 10/12/2020, Normal      metFORMIN (GLUCOPHAGE) 1000 MG tablet TAKE 1 TABLET 2 TIMES A DAY WITH MEALS, Normal      metoprolol succinate (TOPROL-XL) 25 mg 24 hr tablet TAKE 1 TABLET (25  MG TOTAL) BY MOUTH DAILY, Starting Wed 7/22/2020, Normal      naproxen (EC NAPROSYN) 500 MG EC tablet Take 1 tablet (500 mg total) by mouth 2 (two) times a day with meals, Starting Sat 4/21/2018, Normal             No discharge procedures on file.    PDMP Review       None            ED Provider  Electronically Signed by             Maninder Barraza MD  02/07/24 8606

## 2024-02-07 NOTE — DISCHARGE INSTRUCTIONS
Use of prescribed Tylenol, Naprosyn for pain control.  Apply ice to your finger.  We recommend reduced activity for the next week.  We have provided information for multiple hand surgeons.  Call the provided number to schedule follow-up.  If you have any severe worsening of pain, fevers, chills, significant swelling, return to the emergency department.

## 2024-05-29 ENCOUNTER — TELEPHONE (OUTPATIENT)
Age: 59
End: 2024-05-29

## 2024-05-29 NOTE — TELEPHONE ENCOUNTER
Caller: Ishan    Doctor: mingo    Reason for call: calling for Fax# of office and last time patient was seen     Call back#:

## 2025-06-14 DIAGNOSIS — E11.9 TYPE 2 DIABETES MELLITUS WITHOUT COMPLICATION, WITHOUT LONG-TERM CURRENT USE OF INSULIN (HCC): ICD-10-CM

## 2025-06-15 RX ORDER — METOPROLOL SUCCINATE 25 MG/1
25 TABLET, EXTENDED RELEASE ORAL DAILY
Qty: 30 TABLET | Refills: 0 | OUTPATIENT
Start: 2025-06-15